# Patient Record
Sex: FEMALE | Race: WHITE | NOT HISPANIC OR LATINO | Employment: OTHER
[De-identification: names, ages, dates, MRNs, and addresses within clinical notes are randomized per-mention and may not be internally consistent; named-entity substitution may affect disease eponyms.]

---

## 2017-08-28 ENCOUNTER — RECORDS - HEALTHEAST (OUTPATIENT)
Dept: ADMINISTRATIVE | Facility: OTHER | Age: 57
End: 2017-08-28

## 2017-09-07 ENCOUNTER — COMMUNICATION - HEALTHEAST (OUTPATIENT)
Dept: FAMILY MEDICINE | Facility: CLINIC | Age: 57
End: 2017-09-07

## 2017-09-20 ENCOUNTER — HOSPITAL ENCOUNTER (OUTPATIENT)
Dept: PHYSICAL MEDICINE AND REHAB | Facility: CLINIC | Age: 57
Discharge: HOME | End: 2017-09-20
Attending: PHYSICIAN ASSISTANT

## 2017-09-20 DIAGNOSIS — M54.16 LUMBAR RADICULITIS: ICD-10-CM

## 2017-09-20 DIAGNOSIS — M47.816 LUMBAR FACET ARTHROPATHY: ICD-10-CM

## 2017-09-20 DIAGNOSIS — M79.18 MYOFASCIAL PAIN: ICD-10-CM

## 2017-09-21 ENCOUNTER — COMMUNICATION - HEALTHEAST (OUTPATIENT)
Dept: PHYSICAL MEDICINE AND REHAB | Facility: CLINIC | Age: 57
End: 2017-09-21

## 2017-09-21 ENCOUNTER — HOSPITAL ENCOUNTER (OUTPATIENT)
Dept: PHYSICAL MEDICINE AND REHAB | Facility: CLINIC | Age: 57
Discharge: HOME | End: 2017-09-21
Attending: PHYSICIAN ASSISTANT

## 2017-09-21 DIAGNOSIS — M47.816 LUMBAR FACET ARTHROPATHY: ICD-10-CM

## 2017-09-21 DIAGNOSIS — M12.88 OTHER SPECIFIC ARTHROPATHIES, NOT ELSEWHERE CLASSIFIED, OTHER SPECIFIED SITE: ICD-10-CM

## 2020-06-07 ENCOUNTER — HOSPITAL ENCOUNTER (EMERGENCY)
Facility: CLINIC | Age: 60
Discharge: HOME | End: 2020-06-08
Attending: EMERGENCY MEDICINE | Admitting: EMERGENCY MEDICINE
Payer: COMMERCIAL

## 2020-06-07 DIAGNOSIS — F10.920 ALCOHOLIC INTOXICATION WITHOUT COMPLICATION (H): ICD-10-CM

## 2020-06-07 DIAGNOSIS — R40.4 ALTERED LEVEL OF CONSCIOUSNESS: ICD-10-CM

## 2020-06-07 LAB
ALBUMIN SERPL-MCNC: 3.7 G/DL (ref 3.4–5)
ALBUMIN UR-MCNC: NEGATIVE MG/DL
ALP SERPL-CCNC: 132 U/L (ref 40–150)
ALT SERPL W P-5'-P-CCNC: 28 U/L (ref 0–50)
AMPHETAMINES UR QL SCN: POSITIVE
ANION GAP SERPL CALCULATED.3IONS-SCNC: 9 MMOL/L (ref 3–14)
APPEARANCE UR: CLEAR
AST SERPL W P-5'-P-CCNC: 15 U/L (ref 0–45)
BARBITURATES UR QL: NEGATIVE
BASOPHILS # BLD AUTO: 0.1 10E9/L (ref 0–0.2)
BASOPHILS NFR BLD AUTO: 0.7 %
BENZODIAZ UR QL: NEGATIVE
BILIRUB SERPL-MCNC: 0.4 MG/DL (ref 0.2–1.3)
BILIRUB UR QL STRIP: NEGATIVE
BUN SERPL-MCNC: 11 MG/DL (ref 7–30)
CALCIUM SERPL-MCNC: 8.6 MG/DL (ref 8.5–10.1)
CANNABINOIDS UR QL SCN: NEGATIVE
CHLORIDE SERPL-SCNC: 109 MMOL/L (ref 94–109)
CO2 SERPL-SCNC: 24 MMOL/L (ref 20–32)
COCAINE UR QL: NEGATIVE
COLOR UR AUTO: NORMAL
CREAT SERPL-MCNC: 0.8 MG/DL (ref 0.52–1.04)
DIFFERENTIAL METHOD BLD: ABNORMAL
EOSINOPHIL # BLD AUTO: 0.1 10E9/L (ref 0–0.7)
EOSINOPHIL NFR BLD AUTO: 1.5 %
ERYTHROCYTE [DISTWIDTH] IN BLOOD BY AUTOMATED COUNT: 11.7 % (ref 10–15)
ETHANOL SERPL-MCNC: 0.28 G/DL
GFR SERPL CREATININE-BSD FRML MDRD: 81 ML/MIN/{1.73_M2}
GLUCOSE SERPL-MCNC: 131 MG/DL (ref 70–99)
GLUCOSE UR STRIP-MCNC: NEGATIVE MG/DL
HCT VFR BLD AUTO: 49 % (ref 35–47)
HGB BLD-MCNC: 17.2 G/DL (ref 11.7–15.7)
HGB UR QL STRIP: NEGATIVE
IMM GRANULOCYTES # BLD: 0 10E9/L (ref 0–0.4)
IMM GRANULOCYTES NFR BLD: 0.5 %
KETONES UR STRIP-MCNC: NEGATIVE MG/DL
LEUKOCYTE ESTERASE UR QL STRIP: NEGATIVE
LYMPHOCYTES # BLD AUTO: 3.2 10E9/L (ref 0.8–5.3)
LYMPHOCYTES NFR BLD AUTO: 38 %
MCH RBC QN AUTO: 31.4 PG (ref 26.5–33)
MCHC RBC AUTO-ENTMCNC: 35.1 G/DL (ref 31.5–36.5)
MCV RBC AUTO: 90 FL (ref 78–100)
MONOCYTES # BLD AUTO: 0.5 10E9/L (ref 0–1.3)
MONOCYTES NFR BLD AUTO: 6.1 %
NEUTROPHILS # BLD AUTO: 4.5 10E9/L (ref 1.6–8.3)
NEUTROPHILS NFR BLD AUTO: 53.2 %
NITRATE UR QL: NEGATIVE
NRBC # BLD AUTO: 0 10*3/UL
NRBC BLD AUTO-RTO: 0 /100
OPIATES UR QL SCN: NEGATIVE
PCP UR QL SCN: NEGATIVE
PH UR STRIP: 6 PH (ref 5–7)
PLATELET # BLD AUTO: 226 10E9/L (ref 150–450)
POTASSIUM SERPL-SCNC: 3.7 MMOL/L (ref 3.4–5.3)
PROT SERPL-MCNC: 8 G/DL (ref 6.8–8.8)
RBC # BLD AUTO: 5.47 10E12/L (ref 3.8–5.2)
SODIUM SERPL-SCNC: 142 MMOL/L (ref 133–144)
SOURCE: NORMAL
SP GR UR STRIP: 1 (ref 1–1.03)
UROBILINOGEN UR STRIP-MCNC: 0 MG/DL (ref 0–2)
WBC # BLD AUTO: 8.5 10E9/L (ref 4–11)

## 2020-06-07 PROCEDURE — 80320 DRUG SCREEN QUANTALCOHOLS: CPT | Performed by: EMERGENCY MEDICINE

## 2020-06-07 PROCEDURE — 80053 COMPREHEN METABOLIC PANEL: CPT | Performed by: EMERGENCY MEDICINE

## 2020-06-07 PROCEDURE — 25000128 H RX IP 250 OP 636: Performed by: EMERGENCY MEDICINE

## 2020-06-07 PROCEDURE — 85025 COMPLETE CBC W/AUTO DIFF WBC: CPT | Performed by: EMERGENCY MEDICINE

## 2020-06-07 PROCEDURE — 80307 DRUG TEST PRSMV CHEM ANLYZR: CPT | Performed by: EMERGENCY MEDICINE

## 2020-06-07 PROCEDURE — 81003 URINALYSIS AUTO W/O SCOPE: CPT | Performed by: EMERGENCY MEDICINE

## 2020-06-07 PROCEDURE — 99284 EMERGENCY DEPT VISIT MOD MDM: CPT | Mod: Z6 | Performed by: EMERGENCY MEDICINE

## 2020-06-07 PROCEDURE — 96372 THER/PROPH/DIAG INJ SC/IM: CPT | Performed by: EMERGENCY MEDICINE

## 2020-06-07 PROCEDURE — 82075 ASSAY OF BREATH ETHANOL: CPT | Performed by: EMERGENCY MEDICINE

## 2020-06-07 PROCEDURE — 99284 EMERGENCY DEPT VISIT MOD MDM: CPT | Performed by: EMERGENCY MEDICINE

## 2020-06-07 RX ORDER — OLANZAPINE 10 MG/2ML
10 INJECTION, POWDER, FOR SOLUTION INTRAMUSCULAR DAILY PRN
Status: DISCONTINUED | OUTPATIENT
Start: 2020-06-07 | End: 2020-06-08 | Stop reason: HOSPADM

## 2020-06-07 RX ADMIN — OLANZAPINE 10 MG: 10 INJECTION, POWDER, FOR SOLUTION INTRAMUSCULAR at 15:53

## 2020-06-07 ASSESSMENT — MIFFLIN-ST. JEOR: SCORE: 1218.65
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
## 2020-06-07 NOTE — ED TRIAGE NOTES
"Patient found at [facility] off [address] after bystanders called PD due to patient being intoxicated. Upon PD arrival patient was not acting appropriately. PD concerned for patient's health. Placed on transport hold and EMS contacted to bring to ED. Upon patient arrival, patient had her hand over her eyes and refused to remove it After several minutes she removed her hands from her eyes and stated \"this does not look right.\" Patient believed she was at \[facility]\"". Stated her Aunt \"Melisa\" is the head of the hospital and she is also a nurse. Patient argumentative regarding where she was and how she got there. She stated her \"Friend\" (EMS) brought her here from Jerrod's house. \"How tall are you 5'7\"? You think I can't take a bitch like you down?\" She denies any substances other than ETOH.   "

## 2020-06-07 NOTE — ED NOTES
Patient attempted to leave ER. Code 21 called. Patient spoke with provider for a long period of time. Provider was escorting patient back to room but again attempted to leave emergency department. Provider again attempted to persuade patient to go back to her room to be treated. Patient continued to be verbally abusive to all staff. Escorted back to room by RNs with security. Patient lying on floor. Multiple attempts to comfort patient. Patient refused to get on to cart or to have mattress on floor. Patient was given IM Zyprexa. Patient continues to lie on floor.

## 2020-06-07 NOTE — ED AVS SNAPSHOT
[facility] Emergency Department  [address] [address]  Phone:  [phone]  Fax:  [phone]                                    Vitaly Roach   MRN: [id number]    Department:  [facility] Emergency Department   Date of Visit:  6/7/2020           After Visit Summary Signature Page    I have received my discharge instructions, and my questions have been answered. I have discussed any challenges I see with this plan with the nurse or doctor.    ..........................................................................................................................................  Patient/Patient Representative Signature      ..........................................................................................................................................  Patient Representative Print Name and Relationship to Patient    ..................................................               ................................................  Date                                   Time    ..........................................................................................................................................  Reviewed by Signature/Title    ...................................................              ..............................................  Date                                               Time          22EPIC Rev 08/18

## 2020-06-07 NOTE — ED NOTES
Patient moved on to cart with assistance of PCA. Patient arouses to touch. Lab work collected. VSS. 1:1 in place.

## 2020-06-08 VITALS
TEMPERATURE: 98.3 F | HEART RATE: 74 BPM | DIASTOLIC BLOOD PRESSURE: 100 MMHG | BODY MASS INDEX: 27.6 KG/M2 | SYSTOLIC BLOOD PRESSURE: 168 MMHG | OXYGEN SATURATION: 96 % | RESPIRATION RATE: 18 BRPM | WEIGHT: 150 LBS | HEIGHT: 62 IN

## 2020-06-08 LAB — ALCOHOL BREATH TEST: 0 (ref 0–0.01)

## 2020-06-08 NOTE — ED PROVIDER NOTES
Emergency Department Patient Sign-out       Brief HPI:  This is a 59 year old female signed out to me by Dr. SLOANE Deleon at shift change at 6AM.  Plan at the time of signout is that patient is awaiting a ride home.  Patient presented with acute alcohol intoxication with concern about acute encephalopathy likely related to her state of intoxication with agitation.  She required IM Zyprexa for agitation-because she was verbally abusive to the medical staff per documentation and was a threat to herself and safety of the healthcare team. She was allowed to metabolize her alcohol during her ED course.  After period of care in the emergency department plan is to discharge patient home as she has now metabolized her alcohol-after presenting with prehospital blood alcohol level by breathalyzer by police at 0.26 and blood alcohol level in the emergency department of 0.28.  At the time of handoff her blood alcohol level is 0    See Lissa Deleon, Sage and Carissa ED note for details of the presentation, ED course, work-up and plan of care prior to handoff at 6AM.       Significant Events prior to my assuming care: Patient was seen and examined at 6:20 AM.  She was pleasant awake, alert, oriented x3.  GCS 15.  She reported she was not able to secure a ride because she did not have her phone. Patient reports her phone is in her car. Patient reports she lives in [place], [place] with a room mate.  Nursing was able to secure a taxi ride for discharge.    Exam:   Patient Vitals for the past 24 hrs:   BP Temp Temp src Pulse Resp SpO2 Height Weight   06/08/20 0533 -- -- -- -- -- 96 % -- --   06/08/20 0530 (!) 168/100 -- -- -- -- -- -- --   06/08/20 0200 139/85 -- -- 74 -- 97 % -- --   06/08/20 0145 -- -- -- -- -- 94 % -- --   06/08/20 0130 -- -- -- -- -- 95 % -- --   06/08/20 0115 125/70 -- -- -- -- 95 % -- --   06/08/20 0110 126/80 -- -- 73 -- 93 % -- --   06/07/20 2316 137/86 -- -- 72 -- 97 % -- --   06/07/20 1730 125/83 -- -- 76  "-- 95 % -- --   06/07/20 1700 90/62 -- -- 80 18 93 % -- --   06/07/20 1630 91/67 -- -- 88 -- 93 % -- --   06/07/20 1617 127/72 -- -- 86 -- 95 % -- --   06/07/20 1521 (!) 126/91 98.3  F (36.8  C) Oral 95 18 -- 1.575 m (5' 2\") 68 kg (150 lb)           ED RESULTS:   Results for orders placed or performed during the hospital encounter of 06/07/20 (from the past 24 hour(s))   CBC with platelets differential     Status: Abnormal    Collection Time: 06/07/20  4:14 PM   Result Value Ref Range    WBC 8.5 4.0 - 11.0 10e9/L    RBC Count 5.47 (H) 3.8 - 5.2 10e12/L    Hemoglobin 17.2 (H) 11.7 - 15.7 g/dL    Hematocrit 49.0 (H) 35.0 - 47.0 %    MCV 90 78 - 100 fl    MCH 31.4 26.5 - 33.0 pg    MCHC 35.1 31.5 - 36.5 g/dL    RDW 11.7 10.0 - 15.0 %    Platelet Count 226 150 - 450 10e9/L    Diff Method Automated Method     % Neutrophils 53.2 %    % Lymphocytes 38.0 %    % Monocytes 6.1 %    % Eosinophils 1.5 %    % Basophils 0.7 %    % Immature Granulocytes 0.5 %    Nucleated RBCs 0 0 /100    Absolute Neutrophil 4.5 1.6 - 8.3 10e9/L    Absolute Lymphocytes 3.2 0.8 - 5.3 10e9/L    Absolute Monocytes 0.5 0.0 - 1.3 10e9/L    Absolute Eosinophils 0.1 0.0 - 0.7 10e9/L    Absolute Basophils 0.1 0.0 - 0.2 10e9/L    Abs Immature Granulocytes 0.0 0 - 0.4 10e9/L    Absolute Nucleated RBC 0.0    Comprehensive metabolic panel     Status: Abnormal    Collection Time: 06/07/20  4:14 PM   Result Value Ref Range    Sodium 142 133 - 144 mmol/L    Potassium 3.7 3.4 - 5.3 mmol/L    Chloride 109 94 - 109 mmol/L    Carbon Dioxide 24 20 - 32 mmol/L    Anion Gap 9 3 - 14 mmol/L    Glucose 131 (H) 70 - 99 mg/dL    Urea Nitrogen 11 7 - 30 mg/dL    Creatinine 0.80 0.52 - 1.04 mg/dL    GFR Estimate 81 >60 mL/min/[1.73_m2]    GFR Estimate If Black >90 >60 mL/min/[1.73_m2]    Calcium 8.6 8.5 - 10.1 mg/dL    Bilirubin Total 0.4 0.2 - 1.3 mg/dL    Albumin 3.7 3.4 - 5.0 g/dL    Protein Total 8.0 6.8 - 8.8 g/dL    Alkaline Phosphatase 132 40 - 150 U/L    ALT 28 0 - " 50 U/L    AST 15 0 - 45 U/L   Alcohol ethyl     Status: Abnormal    Collection Time: 06/07/20  4:14 PM   Result Value Ref Range    Ethanol g/dL 0.28 (H) <0.01 g/dL   Drug abuse screen urine     Status: Abnormal    Collection Time: 06/07/20  5:44 PM   Result Value Ref Range    Amphetamine Qual Urine Positive (A) NEG^Negative    Barbiturates Qual Urine Negative NEG^Negative    Benzodiazepine Qual Urine Negative NEG^Negative    Cannabinoids Qual Urine Negative NEG^Negative    Cocaine Qual Urine Negative NEG^Negative    Opiates Qualitative Urine Negative NEG^Negative    PCP Qual Urine Negative NEG^Negative   UA reflex to Microscopic and Culture     Status: None    Collection Time: 06/07/20  5:44 PM    Specimen: Unspecified Urine   Result Value Ref Range    Color Urine Straw     Appearance Urine Clear     Glucose Urine Negative NEG^Negative mg/dL    Bilirubin Urine Negative NEG^Negative    Ketones Urine Negative NEG^Negative mg/dL    Specific Gravity Urine 1.003 1.003 - 1.035    Blood Urine Negative NEG^Negative    pH Urine 6.0 5.0 - 7.0 pH    Protein Albumin Urine Negative NEG^Negative mg/dL    Urobilinogen mg/dL 0.0 0.0 - 2.0 mg/dL    Nitrite Urine Negative NEG^Negative    Leukocyte Esterase Urine Negative NEG^Negative    Source Unspecified Urine    Alcohol Breath Test     Status: Normal    Collection Time: 06/08/20  5:46 AM   Result Value Ref Range    Alcohol Breath Test 0.00 0.00 - 0.01       ED MEDICATIONS:   Medications   OLANZapine (zyPREXA) injection 10 mg (10 mg Intramuscular Given 6/7/20 7314)         Impression:    ICD-10-CM    1. Alcoholic intoxication without complication (H)  F10.920    2. Altered level of consciousness  R40.4     resolved       Plan:    Discharge to home.    MD Charmaine Mitchell Ebenezer Tope, MD  06/08/20 0659

## 2020-06-08 NOTE — ED NOTES
Looking for sober ride home. Pt does not have a phone. Pt doesn't know any numbers. Asked if her son would get her, she states yes.   Called son but number is not in service. Pt not able to provide any names or numbers for sober ride

## 2020-06-08 NOTE — ED PROVIDER NOTES
"     Emergency Department Patient Sign-out       Brief HPI:  This is a 59 year old female signed out to me by Dr. Cazares .  See initial ED Provider note for details of the presentation.            Significant Events prior to my assuming care: zyprexa due to agitation      Exam:   Patient Vitals for the past 24 hrs:   BP Temp Temp src Pulse Resp SpO2 Height Weight   06/08/20 0533 -- -- -- -- -- 96 % -- --   06/08/20 0530 (!) 168/100 -- -- -- -- -- -- --   06/08/20 0200 139/85 -- -- 74 -- 97 % -- --   06/08/20 0145 -- -- -- -- -- 94 % -- --   06/08/20 0130 -- -- -- -- -- 95 % -- --   06/08/20 0115 125/70 -- -- -- -- 95 % -- --   06/08/20 0110 126/80 -- -- 73 -- 93 % -- --   06/07/20 2316 137/86 -- -- 72 -- 97 % -- --   06/07/20 1730 125/83 -- -- 76 -- 95 % -- --   06/07/20 1700 90/62 -- -- 80 18 93 % -- --   06/07/20 1630 91/67 -- -- 88 -- 93 % -- --   06/07/20 1617 127/72 -- -- 86 -- 95 % -- --   06/07/20 1521 (!) 126/91 98.3  F (36.8  C) Oral 95 18 -- 1.575 m (5' 2\") 68 kg (150 lb)           ED RESULTS:   Results for orders placed or performed during the hospital encounter of 06/07/20 (from the past 24 hour(s))   CBC with platelets differential     Status: Abnormal    Collection Time: 06/07/20  4:14 PM   Result Value Ref Range    WBC 8.5 4.0 - 11.0 10e9/L    RBC Count 5.47 (H) 3.8 - 5.2 10e12/L    Hemoglobin 17.2 (H) 11.7 - 15.7 g/dL    Hematocrit 49.0 (H) 35.0 - 47.0 %    MCV 90 78 - 100 fl    MCH 31.4 26.5 - 33.0 pg    MCHC 35.1 31.5 - 36.5 g/dL    RDW 11.7 10.0 - 15.0 %    Platelet Count 226 150 - 450 10e9/L    Diff Method Automated Method     % Neutrophils 53.2 %    % Lymphocytes 38.0 %    % Monocytes 6.1 %    % Eosinophils 1.5 %    % Basophils 0.7 %    % Immature Granulocytes 0.5 %    Nucleated RBCs 0 0 /100    Absolute Neutrophil 4.5 1.6 - 8.3 10e9/L    Absolute Lymphocytes 3.2 0.8 - 5.3 10e9/L    Absolute Monocytes 0.5 0.0 - 1.3 10e9/L    Absolute Eosinophils 0.1 0.0 - 0.7 10e9/L    Absolute Basophils 0.1 " 0.0 - 0.2 10e9/L    Abs Immature Granulocytes 0.0 0 - 0.4 10e9/L    Absolute Nucleated RBC 0.0    Comprehensive metabolic panel     Status: Abnormal    Collection Time: 06/07/20  4:14 PM   Result Value Ref Range    Sodium 142 133 - 144 mmol/L    Potassium 3.7 3.4 - 5.3 mmol/L    Chloride 109 94 - 109 mmol/L    Carbon Dioxide 24 20 - 32 mmol/L    Anion Gap 9 3 - 14 mmol/L    Glucose 131 (H) 70 - 99 mg/dL    Urea Nitrogen 11 7 - 30 mg/dL    Creatinine 0.80 0.52 - 1.04 mg/dL    GFR Estimate 81 >60 mL/min/[1.73_m2]    GFR Estimate If Black >90 >60 mL/min/[1.73_m2]    Calcium 8.6 8.5 - 10.1 mg/dL    Bilirubin Total 0.4 0.2 - 1.3 mg/dL    Albumin 3.7 3.4 - 5.0 g/dL    Protein Total 8.0 6.8 - 8.8 g/dL    Alkaline Phosphatase 132 40 - 150 U/L    ALT 28 0 - 50 U/L    AST 15 0 - 45 U/L   Alcohol ethyl     Status: Abnormal    Collection Time: 06/07/20  4:14 PM   Result Value Ref Range    Ethanol g/dL 0.28 (H) <0.01 g/dL   Drug abuse screen urine     Status: Abnormal    Collection Time: 06/07/20  5:44 PM   Result Value Ref Range    Amphetamine Qual Urine Positive (A) NEG^Negative    Barbiturates Qual Urine Negative NEG^Negative    Benzodiazepine Qual Urine Negative NEG^Negative    Cannabinoids Qual Urine Negative NEG^Negative    Cocaine Qual Urine Negative NEG^Negative    Opiates Qualitative Urine Negative NEG^Negative    PCP Qual Urine Negative NEG^Negative   UA reflex to Microscopic and Culture     Status: None    Collection Time: 06/07/20  5:44 PM    Specimen: Unspecified Urine   Result Value Ref Range    Color Urine Straw     Appearance Urine Clear     Glucose Urine Negative NEG^Negative mg/dL    Bilirubin Urine Negative NEG^Negative    Ketones Urine Negative NEG^Negative mg/dL    Specific Gravity Urine 1.003 1.003 - 1.035    Blood Urine Negative NEG^Negative    pH Urine 6.0 5.0 - 7.0 pH    Protein Albumin Urine Negative NEG^Negative mg/dL    Urobilinogen mg/dL 0.0 0.0 - 2.0 mg/dL    Nitrite Urine Negative NEG^Negative     Leukocyte Esterase Urine Negative NEG^Negative    Source Unspecified Urine    Alcohol Breath Test     Status: Normal    Collection Time: 06/08/20  5:46 AM   Result Value Ref Range    Alcohol Breath Test 0.00 0.00 - 0.01       ED MEDICATIONS:   Medications   OLANZapine (zyPREXA) injection 10 mg (10 mg Intramuscular Given 6/7/20 1553)         Impression:    ICD-10-CM    1. Alcoholic intoxication without complication (H)  F10.920    2. Altered level of consciousness  R40.4     resolved       Plan:    Pending studies include observation until sober. Will need further when awake, disposition.     4:10 AM: Assessed at bedside.  Patient resting comfortably, breathing easily.    5:33 AM: Reassessed at bedside.  Alert and oriented.  Does not have any recollection of the events of the night.  Last remembers driving her friend home.  She admits to some drinking of alcohol last night.  Denies any other drug use.  Denies any depression or suicidal thoughts.  Only complains of feeling mildly sore but this, she reports, is normal.  Denies any chest pain, difficulty breathing, abdominal pain, nausea, or other acute symptoms.  Patient states she believes she knows her son's number to get a ride home.    MD Pancho Gann, Bobby Garrett MD  06/08/20 0601

## 2021-05-31 VITALS — WEIGHT: 178 LBS | BODY MASS INDEX: 32.04 KG/M2

## 2021-06-13 NOTE — PROGRESS NOTES
Assessment:   Vitaly Roach is a 56 y.o. y.o. female with past medical history significant for diabetes mellitus, hypertension, vitamin D deficiency, bipolar disorder chronic left greater than right low back pain with radiation into the lower extremity paresthesias who presents today for follow-up regarding.  Spine shows lumbar spondylosis most severe at L5-S1 where there is a broad-based disc bulge and facet arthropathy which contributes to left greater than right foraminal stenosis and lateral recess stenosis.  The patient had a positive response of bilateral L2, L3, L4, L5 medial branch blocks performed at [facility] pain management and November 2015 and subsequently June 2016.  She was planning on following through with the radiofrequency ablation but then she violated her parole and had to go to [facility].  Her insurance has now changed and she is no longer able to go to [facility].       Plan:     A shared decision making plan was used.  The patient's values and choices were respected.  The following represents what was discussed and decided upon by the physician assistant and the patient.      1.  DIAGNOSTIC TESTS: I reviewed the MRI lumbar spine.  No further diagnostic tests were ordered.    2.  PHYSICAL THERAPY: Patient noted to have patient go to physical therapy at the [facility] location of [facility] rehab.    3.  MEDICATIONS:   -Methocarbamol 500-1,000 mg 3 times daily as needed was prescribed.  -Also prescribed lidocaine ointment.  -I had a kenny discussion with the patient regarding use of opiates.  She has used opiates on and off throughout the years.  [facility] was providing OxyContin.  I told the patient that I do not feel prescribing opiates for her chronic pain.  I did offer a referral to the [facility] pain clinic.  The patient indicated that she would actually prefer not to use opiates.  They cause nausea.  She would like to try non-opiate pain relieving medications, physical therapy,  and injections.    4.  INTERVENTIONS: I offeredthe patient had left L3-4, L4-5 and L5-S1 facet joint injection.  I chose this because she did have a positive response to a bilateral L2, L3, L4, L5 medial branch block ×2 at [facility].  The patient's pain is significantly worse on the left (75% left-sided pain is 25% right-sided pain).  The patient indicated she would like to proceed and an order was placed.  If the patient continues have significant right-sided pain, we could perform a right L3-4, L4-5 and some facet joint injection.  I told the patient that if the facet joint injections provided only short-term relief, she may be a candidate for radiofrequency ablation.  We would need to repeat her medial branch blocks.    5.  PATIENT EDUCATION: The patient is in agreement with the above plan.  All questions were answered.    6.  FOLLOW-UP: The patient return to the clinic for her left L3-4 and injection.  She has any questions or concerns in the meantime, she should not clinic.    Subjective:     Vitaly Roach is a 56 y.o. female who presents today for follow-up regarding her low back pain with intermittent radiation into the left lower extremity and bilateral lower extremity paresthesias.  I last saw the patient on October 20, 2015.  At that time she requested a referral to the pain clinic.  She has had [facility] pain and she had a positive response to a bilateral L2, L3, L4, L5 medial branch block ×2.  These were performed in November 2015 in June 2016.  The patient was planning on moving forward with radiofrequency ablation, however, she violated her parole and had to go to detention.  Does not change and she is unable to follow back up with [facility] pain and she returns today because she would like to continue treatments for her low back pain.    The patient complains of left greater than right low back pain.  The patient states that her pain is 75% left-sided and 25% right-sided.  She occasionally has  pain which radiates in the left buttock and down the left posterior thigh, extending into the posterior calf.  Her back pain is much more severe than her leg pain.  She has intermittent numbness and tingling on the plantar forefoot of both feet.  She rates her pain today as an 8 out of 10.  At its best it is a 3 out of 10.  At its worst it is a 10 out of 10.  The patient's pain is aggravated with bending.  It is also aggravated with rolling over in bed.  Transitioning from seated to standing also aggravates the pain, especially if she has been sitting for long period of time.  Her pain is alleviated with laying flat on her back.  The patient states that she has a history of urinary incontinence which is stable.  She feels generally weak.  She also feels that her muscle is very stiff and tight on the left side of her back.    The patient is on physical therapy for her lower back several years ago.  She is currently using Tylenol as needed for pain.  She is prescribed Percocet by the emergency department on September 17.  She has a history of opiate use including OxyContin 10 mg 4 times daily prescribed by the [facility] pain clinic.    Past history is reviewed and is pertinent for the pain clinic treatments and also seeing [facility] for psychiatric care.    Family history is reviewed and is unchanged in the interim.    Review of Systems:  Positive for numbness/tingling, loss of bladder control, weakness, headache, dizziness, nausea/vomiting, blurry vision, balance changes.  Negative for foot drop.     Objective:   CONSTITUTIONAL:  Vital signs as above.  No acute distress.  The patient is well nourished and well groomed.    PSYCHIATRIC:  The patient is awake, alert, oriented to person, place and time.  The patient is answering questions appropriately with clear speech.  Normal affect.  HEENT: Normocephalic, atraumatic.  Sclera clear.    SKIN:  Skin over the face, posterior torso, bilateral upper and  lower extremities is clean, dry, intact without rashes.  MUSCULOSKELETAL:  Gait is non-antalgic. Mild tenderness over the left greater than right lower lumbar paraspinal muscles.      The patient has 5/5 strength for the bilateral hip flexors, knee flexors/extensors, ankle dorsiflexors/plantar flexors, ankle evertors/invertors.  Lumbar extension is restricted due to pain.  Lumbar is left-sided lower back pain.  Rohit's test on the left cause right-sided low back pain.  Rohit's test negative to reproduce left-sided low back pain.  Negative stress on the left.  Negative pelvic distraction.  NEUROLOGICAL: 2+ patellar, achilles reflexes which are symmetric bilaterally.  No ankle clonus bilaterally.  Sensation to light touch is intact in the bilateral L4, L5, and S1 dermatomes.       RESULTS: MRI lumbar spine from [facility] dated September 23, 2015.  This shows mild lumbar spondylosis.  There is mild facet arthropathy at L3-4 and L5-S1.  At L5-S1 there is moderate loss of disc height and a mild broad-based disc protrusion which results in mild spinal canal stenosis and mild right greater than left foraminal stenosis.  There is also mild bilateral foraminal stenosis at L4-5 secondary to a broad-based disc protrusion.  Please see report for further details.

## 2021-06-23 ENCOUNTER — COMMUNICATION - HEALTHEAST (OUTPATIENT)
Dept: SCHEDULING | Facility: CLINIC | Age: 61
End: 2021-06-23

## 2021-06-28 ENCOUNTER — COMMUNICATION - HEALTHEAST (OUTPATIENT)
Dept: CARDIOLOGY | Facility: CLINIC | Age: 61
End: 2021-06-28

## 2021-06-28 DIAGNOSIS — I46.9 CARDIAC ARREST (H): ICD-10-CM

## 2021-06-29 ENCOUNTER — TRANSCRIBE ORDERS (OUTPATIENT)
Dept: EMERGENCY MEDICINE | Facility: HOSPITAL | Age: 61
End: 2021-06-29

## 2021-06-29 DIAGNOSIS — K74.60 HEPATIC CIRRHOSIS, UNSPECIFIED HEPATIC CIRRHOSIS TYPE (H): ICD-10-CM

## 2021-06-29 DIAGNOSIS — T40.2X4A OPIOID OVERDOSE, UNDETERMINED INTENT, INITIAL ENCOUNTER (H): Primary | ICD-10-CM

## 2021-07-07 NOTE — TELEPHONE ENCOUNTER
----- Message from MINAL Rolon sent at 6/28/2021  9:08 AM CDT -----  Regarding: per Discharge line patient is supoosed to have a DONNA monitor - 30 day  Hello,   I was forwarded a message that Dr Heaton saw this patient in the hospital and that they need to be scheduled for a  DONNA monitor - 30 day.   Can I get an order so I can call the patient?   I will double check her insurance when I call.    Thank you   Trinh

## 2021-07-14 ENCOUNTER — COMMUNICATION - HEALTHEAST (OUTPATIENT)
Dept: PHYSICAL MEDICINE AND REHAB | Facility: CLINIC | Age: 61
End: 2021-07-14

## 2021-09-11 ENCOUNTER — APPOINTMENT (OUTPATIENT)
Dept: CT IMAGING | Facility: HOSPITAL | Age: 61
DRG: 177 | End: 2021-09-11
Attending: EMERGENCY MEDICINE
Payer: COMMERCIAL

## 2021-09-11 ENCOUNTER — HOSPITAL ENCOUNTER (INPATIENT)
Facility: HOSPITAL | Age: 61
LOS: 3 days | Discharge: HOME | DRG: 177 | End: 2021-09-15
Attending: EMERGENCY MEDICINE | Admitting: FAMILY MEDICINE
Payer: COMMERCIAL

## 2021-09-11 DIAGNOSIS — I10 ESSENTIAL HYPERTENSION: ICD-10-CM

## 2021-09-11 DIAGNOSIS — M54.50 CHRONIC LOW BACK PAIN WITHOUT SCIATICA, UNSPECIFIED BACK PAIN LATERALITY: Primary | ICD-10-CM

## 2021-09-11 DIAGNOSIS — U07.1 PNEUMONIA DUE TO 2019 NOVEL CORONAVIRUS: ICD-10-CM

## 2021-09-11 DIAGNOSIS — J12.82 PNEUMONIA DUE TO 2019 NOVEL CORONAVIRUS: ICD-10-CM

## 2021-09-11 DIAGNOSIS — G89.29 CHRONIC LOW BACK PAIN WITHOUT SCIATICA, UNSPECIFIED BACK PAIN LATERALITY: Primary | ICD-10-CM

## 2021-09-11 DIAGNOSIS — D69.6 THROMBOCYTOPENIA (H): ICD-10-CM

## 2021-09-11 DIAGNOSIS — E11.65 TYPE 2 DIABETES MELLITUS WITH HYPERGLYCEMIA, WITHOUT LONG-TERM CURRENT USE OF INSULIN (H): ICD-10-CM

## 2021-09-11 PROBLEM — T40.2X1A OPIOID OVERDOSE, ACCIDENTAL OR UNINTENTIONAL, INITIAL ENCOUNTER (H): Status: ACTIVE | Noted: 2021-06-22

## 2021-09-11 PROBLEM — I46.9 CARDIAC ARREST (H): Status: ACTIVE | Noted: 2021-09-11

## 2021-09-11 LAB
ALBUMIN SERPL-MCNC: 3.2 G/DL (ref 3.5–5)
ALP SERPL-CCNC: 82 U/L (ref 45–120)
ALT SERPL W P-5'-P-CCNC: 20 U/L (ref 0–45)
ANION GAP SERPL CALCULATED.3IONS-SCNC: 11 MMOL/L (ref 5–18)
APTT PPP: 29 SECONDS (ref 22–38)
AST SERPL W P-5'-P-CCNC: 31 U/L (ref 0–40)
BASOPHILS # BLD MANUAL: 0 10E3/UL (ref 0–0.2)
BASOPHILS NFR BLD MANUAL: 0 %
BILIRUB DIRECT SERPL-MCNC: 0.1 MG/DL
BILIRUB SERPL-MCNC: 0.4 MG/DL (ref 0–1)
BUN SERPL-MCNC: 9 MG/DL (ref 8–22)
C REACTIVE PROTEIN LHE: 1.3 MG/DL (ref 0–0.8)
CALCIUM SERPL-MCNC: 8.6 MG/DL (ref 8.5–10.5)
CHLORIDE BLD-SCNC: 101 MMOL/L (ref 98–107)
CO2 SERPL-SCNC: 22 MMOL/L (ref 22–31)
CREAT SERPL-MCNC: 0.77 MG/DL (ref 0.6–1.1)
D DIMER PPP FEU-MCNC: 1.01 UG/ML FEU (ref 0–0.5)
EOSINOPHIL # BLD MANUAL: 0 10E3/UL (ref 0–0.7)
EOSINOPHIL NFR BLD MANUAL: 0 %
ERYTHROCYTE [DISTWIDTH] IN BLOOD BY AUTOMATED COUNT: 11.8 % (ref 10–15)
ETHANOL SERPL-MCNC: <10 MG/DL
GFR SERPL CREATININE-BSD FRML MDRD: 85 ML/MIN/1.73M2
GLUCOSE BLD-MCNC: 116 MG/DL (ref 70–125)
HCT VFR BLD AUTO: 42.7 % (ref 35–47)
HGB BLD-MCNC: 14.8 G/DL (ref 11.7–15.7)
INR PPP: 1.01 (ref 0.9–1.15)
LACTATE SERPL-SCNC: 1.2 MMOL/L (ref 0.7–2)
LYMPHOCYTES # BLD MANUAL: 1 10E3/UL (ref 0.8–5.3)
LYMPHOCYTES NFR BLD MANUAL: 36 %
MAGNESIUM SERPL-MCNC: 1.9 MG/DL (ref 1.8–2.6)
MCH RBC QN AUTO: 31.1 PG (ref 26.5–33)
MCHC RBC AUTO-ENTMCNC: 34.7 G/DL (ref 31.5–36.5)
MCV RBC AUTO: 90 FL (ref 78–100)
MONOCYTES # BLD MANUAL: 0.2 10E3/UL (ref 0–1.3)
MONOCYTES NFR BLD MANUAL: 8 %
NEUTROPHILS # BLD MANUAL: 1.5 10E3/UL (ref 1.6–8.3)
NEUTROPHILS NFR BLD MANUAL: 56 %
PLAT MORPH BLD: ABNORMAL
PLATELET # BLD AUTO: 91 10E3/UL (ref 150–450)
POTASSIUM BLD-SCNC: 3.8 MMOL/L (ref 3.5–5)
PROT SERPL-MCNC: 6.7 G/DL (ref 6–8)
RBC # BLD AUTO: 4.76 10E6/UL (ref 3.8–5.2)
RBC MORPH BLD: ABNORMAL
SARS-COV-2 RNA RESP QL NAA+PROBE: POSITIVE
SODIUM SERPL-SCNC: 134 MMOL/L (ref 136–145)
TROPONIN I SERPL-MCNC: <0.01 NG/ML (ref 0–0.29)
WBC # BLD AUTO: 2.7 10E3/UL (ref 4–11)

## 2021-09-11 PROCEDURE — 99223 1ST HOSP IP/OBS HIGH 75: CPT | Performed by: FAMILY MEDICINE

## 2021-09-11 PROCEDURE — 85379 FIBRIN DEGRADATION QUANT: CPT | Performed by: EMERGENCY MEDICINE

## 2021-09-11 PROCEDURE — 83735 ASSAY OF MAGNESIUM: CPT | Performed by: EMERGENCY MEDICINE

## 2021-09-11 PROCEDURE — 250N000011 HC RX IP 250 OP 636: Performed by: EMERGENCY MEDICINE

## 2021-09-11 PROCEDURE — 86141 C-REACTIVE PROTEIN HS: CPT | Performed by: EMERGENCY MEDICINE

## 2021-09-11 PROCEDURE — 84484 ASSAY OF TROPONIN QUANT: CPT | Performed by: FAMILY MEDICINE

## 2021-09-11 PROCEDURE — 85610 PROTHROMBIN TIME: CPT | Performed by: EMERGENCY MEDICINE

## 2021-09-11 PROCEDURE — 93005 ELECTROCARDIOGRAM TRACING: CPT | Performed by: EMERGENCY MEDICINE

## 2021-09-11 PROCEDURE — 83605 ASSAY OF LACTIC ACID: CPT | Performed by: EMERGENCY MEDICINE

## 2021-09-11 PROCEDURE — 71275 CT ANGIOGRAPHY CHEST: CPT

## 2021-09-11 PROCEDURE — 82248 BILIRUBIN DIRECT: CPT | Performed by: EMERGENCY MEDICINE

## 2021-09-11 PROCEDURE — 85027 COMPLETE CBC AUTOMATED: CPT | Performed by: EMERGENCY MEDICINE

## 2021-09-11 PROCEDURE — 87040 BLOOD CULTURE FOR BACTERIA: CPT | Performed by: EMERGENCY MEDICINE

## 2021-09-11 PROCEDURE — 82077 ASSAY SPEC XCP UR&BREATH IA: CPT | Performed by: EMERGENCY MEDICINE

## 2021-09-11 PROCEDURE — C9803 HOPD COVID-19 SPEC COLLECT: HCPCS

## 2021-09-11 PROCEDURE — 36415 COLL VENOUS BLD VENIPUNCTURE: CPT | Performed by: EMERGENCY MEDICINE

## 2021-09-11 PROCEDURE — 96374 THER/PROPH/DIAG INJ IV PUSH: CPT | Mod: 59

## 2021-09-11 PROCEDURE — 85730 THROMBOPLASTIN TIME PARTIAL: CPT | Performed by: EMERGENCY MEDICINE

## 2021-09-11 PROCEDURE — XW043E5 INTRODUCTION OF REMDESIVIR ANTI-INFECTIVE INTO CENTRAL VEIN, PERCUTANEOUS APPROACH, NEW TECHNOLOGY GROUP 5: ICD-10-PCS | Performed by: HOSPITALIST

## 2021-09-11 PROCEDURE — 85384 FIBRINOGEN ACTIVITY: CPT | Performed by: FAMILY MEDICINE

## 2021-09-11 PROCEDURE — 99285 EMERGENCY DEPT VISIT HI MDM: CPT | Mod: 25

## 2021-09-11 PROCEDURE — 84484 ASSAY OF TROPONIN QUANT: CPT | Performed by: EMERGENCY MEDICINE

## 2021-09-11 PROCEDURE — 87635 SARS-COV-2 COVID-19 AMP PRB: CPT | Performed by: EMERGENCY MEDICINE

## 2021-09-11 PROCEDURE — 96375 TX/PRO/DX INJ NEW DRUG ADDON: CPT

## 2021-09-11 RX ORDER — DIPHENHYDRAMINE HCL 25 MG
1 CAPSULE ORAL EVERY 6 HOURS PRN
COMMUNITY
End: 2024-08-28

## 2021-09-11 RX ORDER — IOPAMIDOL 755 MG/ML
100 INJECTION, SOLUTION INTRAVASCULAR ONCE
Status: COMPLETED | OUTPATIENT
Start: 2021-09-11 | End: 2021-09-11

## 2021-09-11 RX ORDER — KETOROLAC TROMETHAMINE 15 MG/ML
15 INJECTION, SOLUTION INTRAMUSCULAR; INTRAVENOUS ONCE
Status: COMPLETED | OUTPATIENT
Start: 2021-09-11 | End: 2021-09-11

## 2021-09-11 RX ORDER — DEXAMETHASONE SODIUM PHOSPHATE 10 MG/ML
6 INJECTION, SOLUTION INTRAMUSCULAR; INTRAVENOUS ONCE
Status: COMPLETED | OUTPATIENT
Start: 2021-09-11 | End: 2021-09-11

## 2021-09-11 RX ADMIN — DEXAMETHASONE SODIUM PHOSPHATE 6 MG: 10 INJECTION, SOLUTION INTRAMUSCULAR; INTRAVENOUS at 22:06

## 2021-09-11 RX ADMIN — IOPAMIDOL 100 ML: 755 INJECTION, SOLUTION INTRAVENOUS at 21:46

## 2021-09-11 RX ADMIN — KETOROLAC TROMETHAMINE 15 MG: 15 INJECTION, SOLUTION INTRAMUSCULAR; INTRAVENOUS at 20:59

## 2021-09-11 NOTE — ED TRIAGE NOTES
The pt presents for evaluation of generalized weakness, loss of taste/smell and fevers for about 8 days. The pt notes that last month she accidentally overdose on a medication and was given CPR, breaking ribs, leading to pneumonia.

## 2021-09-12 LAB
ABO/RH(D): NORMAL
ALBUMIN SERPL-MCNC: 3.3 G/DL (ref 3.5–5)
ALP SERPL-CCNC: 86 U/L (ref 45–120)
ALT SERPL W P-5'-P-CCNC: 22 U/L (ref 0–45)
ANION GAP SERPL CALCULATED.3IONS-SCNC: 9 MMOL/L (ref 5–18)
APTT PPP: 32 SECONDS (ref 22–38)
AST SERPL W P-5'-P-CCNC: 31 U/L (ref 0–40)
ATRIAL RATE - MUSE: 78 BPM
BILIRUB DIRECT SERPL-MCNC: 0.2 MG/DL
BILIRUB SERPL-MCNC: 0.4 MG/DL (ref 0–1)
BUN SERPL-MCNC: 16 MG/DL (ref 8–22)
C REACTIVE PROTEIN LHE: 1.7 MG/DL (ref 0–0.8)
CALCIUM SERPL-MCNC: 8.9 MG/DL (ref 8.5–10.5)
CHLORIDE BLD-SCNC: 103 MMOL/L (ref 98–107)
CO2 SERPL-SCNC: 21 MMOL/L (ref 22–31)
CREAT SERPL-MCNC: 0.78 MG/DL (ref 0.6–1.1)
DIASTOLIC BLOOD PRESSURE - MUSE: NORMAL MMHG
ERYTHROCYTE [DISTWIDTH] IN BLOOD BY AUTOMATED COUNT: 11.7 % (ref 10–15)
ETHANOL SERPL-MCNC: <10 MG/DL
FIBRINOGEN PPP-MCNC: 466 MG/DL (ref 170–490)
GFR SERPL CREATININE-BSD FRML MDRD: 84 ML/MIN/1.73M2
GLUCOSE BLD-MCNC: 381 MG/DL (ref 70–125)
GLUCOSE BLDC GLUCOMTR-MCNC: 188 MG/DL (ref 70–99)
GLUCOSE BLDC GLUCOMTR-MCNC: 315 MG/DL (ref 70–99)
GLUCOSE BLDC GLUCOMTR-MCNC: 341 MG/DL (ref 70–99)
GLUCOSE BLDC GLUCOMTR-MCNC: 372 MG/DL (ref 70–99)
GLUCOSE BLDC GLUCOMTR-MCNC: 416 MG/DL (ref 70–99)
HBA1C MFR BLD: 7 %
HCT VFR BLD AUTO: 44.1 % (ref 35–47)
HGB BLD-MCNC: 15.7 G/DL (ref 11.7–15.7)
HOLD SPECIMEN: NORMAL
INR PPP: 1.08 (ref 0.9–1.15)
INTERPRETATION ECG - MUSE: NORMAL
LDH SERPL L TO P-CCNC: 280 U/L (ref 125–220)
MAGNESIUM SERPL-MCNC: 2.1 MG/DL (ref 1.8–2.6)
MCH RBC QN AUTO: 31.5 PG (ref 26.5–33)
MCHC RBC AUTO-ENTMCNC: 35.6 G/DL (ref 31.5–36.5)
MCV RBC AUTO: 88 FL (ref 78–100)
P AXIS - MUSE: 63 DEGREES
PHOSPHATE SERPL-MCNC: 3.7 MG/DL (ref 2.5–4.5)
PLATELET # BLD AUTO: 110 10E3/UL (ref 150–450)
POTASSIUM BLD-SCNC: 4 MMOL/L (ref 3.5–5)
PR INTERVAL - MUSE: 142 MS
PROCALCITONIN SERPL-MCNC: 0.14 NG/ML (ref 0–0.49)
PROT SERPL-MCNC: 7 G/DL (ref 6–8)
QRS DURATION - MUSE: 84 MS
QT - MUSE: 422 MS
QTC - MUSE: 481 MS
R AXIS - MUSE: 61 DEGREES
RBC # BLD AUTO: 4.99 10E6/UL (ref 3.8–5.2)
SODIUM SERPL-SCNC: 133 MMOL/L (ref 136–145)
SPECIMEN EXPIRATION DATE: NORMAL
SYSTOLIC BLOOD PRESSURE - MUSE: NORMAL MMHG
T AXIS - MUSE: 54 DEGREES
TROPONIN I SERPL-MCNC: <0.01 NG/ML (ref 0–0.29)
VENTRICULAR RATE- MUSE: 78 BPM
WBC # BLD AUTO: 3.7 10E3/UL (ref 4–11)

## 2021-09-12 PROCEDURE — 82077 ASSAY SPEC XCP UR&BREATH IA: CPT | Performed by: FAMILY MEDICINE

## 2021-09-12 PROCEDURE — 250N000011 HC RX IP 250 OP 636: Performed by: FAMILY MEDICINE

## 2021-09-12 PROCEDURE — 83735 ASSAY OF MAGNESIUM: CPT | Performed by: FAMILY MEDICINE

## 2021-09-12 PROCEDURE — 84100 ASSAY OF PHOSPHORUS: CPT | Performed by: FAMILY MEDICINE

## 2021-09-12 PROCEDURE — 84145 PROCALCITONIN (PCT): CPT | Performed by: FAMILY MEDICINE

## 2021-09-12 PROCEDURE — 250N000012 HC RX MED GY IP 250 OP 636 PS 637: Performed by: FAMILY MEDICINE

## 2021-09-12 PROCEDURE — 85730 THROMBOPLASTIN TIME PARTIAL: CPT | Performed by: FAMILY MEDICINE

## 2021-09-12 PROCEDURE — 36415 COLL VENOUS BLD VENIPUNCTURE: CPT | Performed by: FAMILY MEDICINE

## 2021-09-12 PROCEDURE — 258N000003 HC RX IP 258 OP 636: Performed by: FAMILY MEDICINE

## 2021-09-12 PROCEDURE — 83036 HEMOGLOBIN GLYCOSYLATED A1C: CPT | Performed by: FAMILY MEDICINE

## 2021-09-12 PROCEDURE — 85610 PROTHROMBIN TIME: CPT | Performed by: FAMILY MEDICINE

## 2021-09-12 PROCEDURE — 86141 C-REACTIVE PROTEIN HS: CPT | Performed by: HOSPITALIST

## 2021-09-12 PROCEDURE — 86900 BLOOD TYPING SEROLOGIC ABO: CPT | Performed by: FAMILY MEDICINE

## 2021-09-12 PROCEDURE — 250N000009 HC RX 250: Performed by: FAMILY MEDICINE

## 2021-09-12 PROCEDURE — 250N000012 HC RX MED GY IP 250 OP 636 PS 637: Performed by: HOSPITALIST

## 2021-09-12 PROCEDURE — 120N000001 HC R&B MED SURG/OB

## 2021-09-12 PROCEDURE — 99233 SBSQ HOSP IP/OBS HIGH 50: CPT | Performed by: HOSPITALIST

## 2021-09-12 PROCEDURE — 83615 LACTATE (LD) (LDH) ENZYME: CPT | Performed by: FAMILY MEDICINE

## 2021-09-12 PROCEDURE — 85014 HEMATOCRIT: CPT | Performed by: FAMILY MEDICINE

## 2021-09-12 PROCEDURE — 250N000013 HC RX MED GY IP 250 OP 250 PS 637: Performed by: FAMILY MEDICINE

## 2021-09-12 PROCEDURE — 82248 BILIRUBIN DIRECT: CPT | Performed by: FAMILY MEDICINE

## 2021-09-12 RX ORDER — FLUMAZENIL 0.1 MG/ML
0.2 INJECTION, SOLUTION INTRAVENOUS
Status: DISCONTINUED | OUTPATIENT
Start: 2021-09-12 | End: 2021-09-15 | Stop reason: HOSPADM

## 2021-09-12 RX ORDER — HALOPERIDOL 5 MG/ML
2.5-5 INJECTION INTRAMUSCULAR EVERY 6 HOURS PRN
Status: DISCONTINUED | OUTPATIENT
Start: 2021-09-12 | End: 2021-09-15 | Stop reason: HOSPADM

## 2021-09-12 RX ORDER — OLANZAPINE 5 MG/1
5-10 TABLET, ORALLY DISINTEGRATING ORAL EVERY 6 HOURS PRN
Status: DISCONTINUED | OUTPATIENT
Start: 2021-09-12 | End: 2021-09-15 | Stop reason: HOSPADM

## 2021-09-12 RX ORDER — MULTIPLE VITAMINS W/ MINERALS TAB 9MG-400MCG
1 TAB ORAL DAILY
Status: DISCONTINUED | OUTPATIENT
Start: 2021-09-12 | End: 2021-09-15 | Stop reason: HOSPADM

## 2021-09-12 RX ORDER — DEXTROSE MONOHYDRATE 25 G/50ML
25-50 INJECTION, SOLUTION INTRAVENOUS
Status: DISCONTINUED | OUTPATIENT
Start: 2021-09-12 | End: 2021-09-15 | Stop reason: HOSPADM

## 2021-09-12 RX ORDER — GABAPENTIN 100 MG/1
200 CAPSULE ORAL 3 TIMES DAILY
Status: DISCONTINUED | OUTPATIENT
Start: 2021-09-12 | End: 2021-09-15 | Stop reason: HOSPADM

## 2021-09-12 RX ORDER — ACETAMINOPHEN 325 MG/1
650 TABLET ORAL EVERY 6 HOURS PRN
Status: DISCONTINUED | OUTPATIENT
Start: 2021-09-12 | End: 2021-09-15 | Stop reason: HOSPADM

## 2021-09-12 RX ORDER — ACETAMINOPHEN 650 MG/1
650 SUPPOSITORY RECTAL EVERY 6 HOURS PRN
Status: DISCONTINUED | OUTPATIENT
Start: 2021-09-12 | End: 2021-09-15 | Stop reason: HOSPADM

## 2021-09-12 RX ORDER — LORAZEPAM 2 MG/ML
1-2 INJECTION INTRAMUSCULAR EVERY 30 MIN PRN
Status: DISCONTINUED | OUTPATIENT
Start: 2021-09-12 | End: 2021-09-15 | Stop reason: HOSPADM

## 2021-09-12 RX ORDER — LIDOCAINE 40 MG/G
CREAM TOPICAL
Status: DISCONTINUED | OUTPATIENT
Start: 2021-09-12 | End: 2021-09-15 | Stop reason: HOSPADM

## 2021-09-12 RX ORDER — ONDANSETRON 4 MG/1
4 TABLET, ORALLY DISINTEGRATING ORAL EVERY 6 HOURS PRN
Status: DISCONTINUED | OUTPATIENT
Start: 2021-09-12 | End: 2021-09-15 | Stop reason: HOSPADM

## 2021-09-12 RX ORDER — LIDOCAINE 4 G/G
1 PATCH TOPICAL EVERY 24 HOURS
Status: DISCONTINUED | OUTPATIENT
Start: 2021-09-12 | End: 2021-09-15 | Stop reason: HOSPADM

## 2021-09-12 RX ORDER — LANOLIN ALCOHOL/MO/W.PET/CERES
100 CREAM (GRAM) TOPICAL DAILY
Status: DISCONTINUED | OUTPATIENT
Start: 2021-09-12 | End: 2021-09-15 | Stop reason: HOSPADM

## 2021-09-12 RX ORDER — AMOXICILLIN 250 MG
1 CAPSULE ORAL 2 TIMES DAILY PRN
Status: DISCONTINUED | OUTPATIENT
Start: 2021-09-12 | End: 2021-09-15 | Stop reason: HOSPADM

## 2021-09-12 RX ORDER — POLYETHYLENE GLYCOL 3350 17 G/17G
17 POWDER, FOR SOLUTION ORAL DAILY PRN
Status: DISCONTINUED | OUTPATIENT
Start: 2021-09-12 | End: 2021-09-15 | Stop reason: HOSPADM

## 2021-09-12 RX ORDER — ONDANSETRON 2 MG/ML
4 INJECTION INTRAMUSCULAR; INTRAVENOUS EVERY 6 HOURS PRN
Status: DISCONTINUED | OUTPATIENT
Start: 2021-09-12 | End: 2021-09-15 | Stop reason: HOSPADM

## 2021-09-12 RX ORDER — AMOXICILLIN 250 MG
2 CAPSULE ORAL 2 TIMES DAILY PRN
Status: DISCONTINUED | OUTPATIENT
Start: 2021-09-12 | End: 2021-09-15 | Stop reason: HOSPADM

## 2021-09-12 RX ORDER — LORAZEPAM 1 MG/1
1-2 TABLET ORAL EVERY 30 MIN PRN
Status: DISCONTINUED | OUTPATIENT
Start: 2021-09-12 | End: 2021-09-15 | Stop reason: HOSPADM

## 2021-09-12 RX ORDER — FOLIC ACID 1 MG/1
1 TABLET ORAL DAILY
Status: DISCONTINUED | OUTPATIENT
Start: 2021-09-12 | End: 2021-09-15 | Stop reason: HOSPADM

## 2021-09-12 RX ORDER — NICOTINE POLACRILEX 4 MG
15-30 LOZENGE BUCCAL
Status: DISCONTINUED | OUTPATIENT
Start: 2021-09-12 | End: 2021-09-15 | Stop reason: HOSPADM

## 2021-09-12 RX ADMIN — SODIUM CHLORIDE 50 ML: 9 INJECTION, SOLUTION INTRAVENOUS at 19:53

## 2021-09-12 RX ADMIN — GABAPENTIN 200 MG: 100 CAPSULE ORAL at 20:56

## 2021-09-12 RX ADMIN — INSULIN ASPART 4 UNITS: 100 INJECTION, SOLUTION INTRAVENOUS; SUBCUTANEOUS at 17:02

## 2021-09-12 RX ADMIN — SODIUM CHLORIDE, PRESERVATIVE FREE: 5 INJECTION INTRAVENOUS at 03:14

## 2021-09-12 RX ADMIN — REMDESIVIR 100 MG: 100 INJECTION, POWDER, LYOPHILIZED, FOR SOLUTION INTRAVENOUS at 17:37

## 2021-09-12 RX ADMIN — MULTIPLE VITAMINS W/ MINERALS TAB 1 TABLET: TAB at 08:23

## 2021-09-12 RX ADMIN — GABAPENTIN 200 MG: 100 CAPSULE ORAL at 13:49

## 2021-09-12 RX ADMIN — INSULIN GLARGINE 10 UNITS: 100 INJECTION, SOLUTION SUBCUTANEOUS at 13:50

## 2021-09-12 RX ADMIN — INSULIN ASPART 5 UNITS: 100 INJECTION, SOLUTION INTRAVENOUS; SUBCUTANEOUS at 12:27

## 2021-09-12 RX ADMIN — ENOXAPARIN SODIUM 40 MG: 40 INJECTION SUBCUTANEOUS at 08:24

## 2021-09-12 RX ADMIN — FOLIC ACID 1 MG: 1 TABLET ORAL at 08:23

## 2021-09-12 RX ADMIN — REMDESIVIR 200 MG: 100 INJECTION, POWDER, LYOPHILIZED, FOR SOLUTION INTRAVENOUS at 01:45

## 2021-09-12 RX ADMIN — INSULIN ASPART 5 UNITS: 100 INJECTION, SOLUTION INTRAVENOUS; SUBCUTANEOUS at 20:58

## 2021-09-12 RX ADMIN — GABAPENTIN 200 MG: 100 CAPSULE ORAL at 08:23

## 2021-09-12 RX ADMIN — Medication 100 MG: at 08:23

## 2021-09-12 RX ADMIN — LIDOCAINE 1 PATCH: 246 PATCH TOPICAL at 08:21

## 2021-09-12 RX ADMIN — ACETAMINOPHEN 650 MG: 325 TABLET ORAL at 08:49

## 2021-09-12 RX ADMIN — DEXAMETHASONE 6 MG: 4 TABLET ORAL at 08:23

## 2021-09-12 RX ADMIN — INSULIN ASPART 5 UNITS: 100 INJECTION, SOLUTION INTRAVENOUS; SUBCUTANEOUS at 08:50

## 2021-09-12 NOTE — ED NOTES
[facility] ED Handoff  Report    ED Chief Complaint:     ED Diagnosis:  (U07.1,  J12.82) Pneumonia due to 2019 novel coronavirus  Comment:   Plan:     (D69.6) Thrombocytopenia (H)  Comment:   Plan:        PMH:    Past Medical History:   Diagnosis Date     Bipolar 1 disorder (H)      Chronic back pain      Diabetes mellitus (H)      Hepatitis C      Hepatitis C 2003     Hypertension      Substance abuse (H)         Code Status:  No Order     Falls Risk: Yes Band: Not applicable    Current Living Situation/Residence: lives alone     Elimination Status: Continent: Yes     Activity Level: Independent    Patients Preferred Language:  English     Needed: No    Vital Signs:  BP (!) 147/80   Pulse 77   Temp (!) 100.8  F (38.2  C) (Oral)   Resp 20   Wt 79.4 kg (175 lb)   SpO2 92%   BMI 32.01 kg/m       Cardiac Rhythm:     Pain Score: 8/10    Is the Patient Confused:  No    Last Food or Drink: 09/11/21 at     Focused Assessment:  Resp, cardiac      Tests Performed: Done: Labs and Imaging    Treatments Provided:  Meds, labs     Family Dynamics/Concerns: No    Family Updated On Visitor Policy: Yes    Plan of Care Communicated to Family: No    Who Was Updated about Plan of Care: n/a    Belongings Checklist Done and Signed by Patient: Yes    Covid: symptomatic, positive    Additional Information:     RN: Jade Ford   9/11/2021 11:54 PM

## 2021-09-12 NOTE — PROGRESS NOTES
[facility] Hospitalist Progress Note  [facility]  Summary:    60F year old female admitted on 9/11/2021 for EVAR, shortness of breath secondary to COVID-19 pneumonia.  She has a past history of treated hepatitis C, diet-controlled diabetes, hypertension, methamphetamine use, alcohol use, chronic back pain.    Assessment/Plan    #Confirmed COVID-19 infection complicated by viral pneumonia and acute hypoxic respiratory failure  Diagnosed on:  09/11  Duration of illness prior to admission:  About 7 days ago  - COVID therapeutics:   Decadron and remdesivir started 09/11  - Oxygenation/pulmonary: stable on 2L  - Labs: add on CRP and d-dimer  - At high risk of thrombotic complications due to COVID-19 disease.   Risk Category A, lovenox   - Discharge Anticoagulation: At discharge consider one of the following for 30 days and until the patient has returned to normal mobility:  Apixaban (Eliquis) 2.5 mg two times a day or Rivaroxaban (Xarelto) 10 mg once daily    #Thrombocytopenia likely due to cirrhosis  -monitor with DVT px, continue lovenox as long as plat > 50     #Diabetes - uncontrolled with steroids.  Last a1c 6.8  -start lantus 10m sliding scale insulin     #Essential hypertension - Not on any medication.  Use hydralazine as needed.  Was on Norvasc in past, consider restarting.     #Cirrhosis, Chronic hepatitis C - treated according to patient.  monitor LFTs with remdesivir.     History of meth use-check U tox pending     History of of moderate to heavy alcohol use-check alcohol level, start withdrawal protocol.  No signs of withdrawal.     Tobacco abuse-counseled quitting.  Gum as needed     Chronic low back pain-no red flags, nonradicular.  x-ray pending.  avoid narcotics, start lidocaine patch     4 mm right lung nodule on CT-outpatient follow-up, discussed with patient     Diarrhea-unremarkable abdominal exam, LFTs.  Likely secondary to Covid.  Check C. difficile, stool culture if  recurs      Checklist:  Code Status: Full Code    Diet: Consistent Carb 75 grams CHO per Meal Diet    Todd Catheter: Not present  Central Lines: None      Overnight Events/Subjective/Notable results:  Fever 100.8 yesterday.  Overall feeling better.  Breathing improved.  Dry cough.    4 point ROS otherwise negative    Objective    Vital signs in last 24 hours  Temp:  [98.4  F (36.9  C)-100.8  F (38.2  C)] 98.8  F (37.1  C)  Pulse:  [68-82] 71  Resp:  [18-37] 22  BP: (137-164)/(77-94) 149/88  SpO2:  [86 %-97 %] 97 % O2 Device: Nasal cannula    Weight:   174 lbs 2.61 oz    Intake/Output last 3 shifts  I/O last 3 completed shifts:  In: -   Out: 200 [Urine:200]  Body mass index is 31.85 kg/m .    Physical Exam  General:  Alert, cooperative, no distress,  Appears stated age, chronically ill appearing  Neurologic:  oriented, facialsymmetry preserved, fluent speech.   Psych: calm, mood and affect appropriate to situation  HEENT:  Anicteric, MMM, unremarkable dentition  CV:  no edema  Lungs: Easyrespirations,  Limited ability to auscultate due to need for PPE and air filtration system.  No audible wheezing or rhonchi.  Abd: soft, NT, normoactive BS  Skin: no rashes noted on exposed skin. Color and turgor normal  Central Lines and Tubes: None (no todd, CVC, feeding tubes)      I have reviewed all labs, medications, imaging studies in the last 24 hours.  Pertinent findings&changes discussed above.    Data     Armaan Castillo MD  Internal Medicine Hospitalist

## 2021-09-12 NOTE — H&P
[facility]    History and Physical - Hospitalist Service       Date of Admission:  9/11/2021    Active Problems:    Chronic hepatitis C virus infection (H)    Diabetes mellitus (H)    Essential hypertension    Thrombocytopenia (H)    Pneumonia due to 2019 novel coronavirus    Assessment & Plan      Vitaly Roach is a 60 year old female admitted on 9/11/2021 for EVAR, shortness of breath secondary to COVID-19 pneumonia.  She has a past history of treated hepatitis C, diet-controlled diabetes, hypertension, methamphetamine use, alcohol use, chronic back pain.    # Confirmed COVID-19 infection    # Acute Hypoxic Respiratory Failure secondary to COVID-19 infection  # Viral Pneumonia secondary to COVID-19 infection    - Admit to medical floor with continuous pulse oximetry, COVID-19 special precautions  - Oxygen: continue current support with nasal cannula at 2 L/min; titrate to keep SpO2 between 90-96%  - Labs: standard [facility] admission labs ordered (CBC with diff, CMP, retic count, LDH, troponin, BNP, CK, INR/PT, PTT, D-dimer, fibrinogen, antithrombin, ferritin, CRP, IL-6)   - Imaging: chest CT with contrast ordered  - Breathing treatments: no inhalers needed; avoid nebulizers in favor of MDIs   - IV fluids: not indicated at this time  - Antibiotics: not indicated   - COVID-Focused Medications: Dexamethasone 6 mg x 10 days or until hospital discharge, started on 9/11 and Remdesivir x 5 days or until hospital discharge, started on 9/12  - DVT Prophylaxis: at high risk of thrombotic complications due to COVID-19 (DDimer = 1.01 ug/mL FEU (Ref range: 0.00 - 0.50 ug/mL FEU) ).          - PROPHYLACTIC dosing: lovenox 40mg daily        - consider anticoag on discharge for 30 days & until return to normal mobility    Became hypoxic on transfer to the floor.  Start dexamethasone, start remdesivir.  Start I-S, continuous O2 monitoring.  Start weaning.    Thrombocytopenia-  Suspect secondary to hepatitis  C, mild.  Also could be from infection.  Per up-to-date okay to continue Lovenox DVT prophylaxis given high risk Covid, follow platelets closely.  Discontinue if platelets go less than 50.  Peripheral smear.    Diabetes-  Type II, A1c a few months ago was 6.8, start sliding scale, may need to start NPH versus Lantus    Essential hypertension-accelerated.  Not on any medication.  Use hydralazine as needed.  Was on Norvasc, consider restarting.    Chronic hepatitis C-treated according to patient.  Unremarkable LFTs..    History of meth use-check U tox.    History of of moderate to heavy alcohol use-check alcohol level, start withdrawal protocol.  No signs of withdrawal.    Tobacco abuse-counseled quitting.  Gum as needed    Chronic low back pain-no red flags, nonradicular.  Check x-ray, UA, conservative management, avoid narcotics, start lidocaine patch    4 mm right lung nodule on CT-outpatient follow-up, discussed with patient    Diarrhea-unremarkable abdominal exam, LFTs.  Likely secondary to Covid.  Check C. difficile, stool culture     Diet:  Regular  DVT Prophylaxis: Enoxaparin (Lovenox) SQ  Christian Catheter: Not present  Central Lines: None  Code Status:  Code    Clinically Significant Risk Factors Present on Admission              # Thrombocytopenia: Plts = 91 10e3/uL (Ref range: 150 - 450 10e3/uL) on admission, will monitor for bleeding      Disposition Plan   Expected discharge:  in 2 to 4 days recommended to prior living arrangement once Temps improved.     The patient's care was discussed with the Bedside Nurse and Patient.    TOYA RYAN MD  [facility]  Securely message with the [employer] Web Console (learn more here)  Text page via [employer] Paging/Directory      ______________________________________________________________________    Chief Complaint   Fever    History is obtained from the patient    History of Present Illness   Vitaly Roach is a 60 year old female who is being  admitted for fever, shortness of breath and cough with positive Covid.  Patient started to have symptoms of fatigue, fever, shortness of breath and cough approximately 7 days ago.  She also lost her sense of taste.  Her cough is productive with mucus that is green.  She also has had diarrhea for 2 days but denies any abdominal pain.  She denies any chest pain.  She has chronic low back pain for over a year that is nonradicular and unchanged.  She has not been vaccinated.  She has a past history of treated hepatitis C, diet-controlled diabetes, hypertension, ongoing methamphetamine use, last used yesterday, ongoing alcohol use.  She is not taking any medications.    Family history positive for heart disease in her mother    Habits patient smokes 1 pack of cigarettes a week, drinks alcohol daily, one third of a container of vodka, smokes methamphetamine intermittently, last dose yesterday, uses it for back pain.    Social history patient is , has 3 children, does not work    Review of Systems  The 10 point Review of Systems is negative other than noted in the HPI or here.     Past Medical History    I have reviewed this patient's medical history and updated it with pertinent information if needed.   Past Medical History:   Diagnosis Date     Bipolar 1 disorder (H)      Chronic back pain      Diabetes mellitus (H)      Hepatitis C      Hepatitis C 2003     Hypertension      Substance abuse (H)        Past Surgical History   I have reviewed this patient's surgical history and updated it with pertinent information if needed.  Past Surgical History:   Procedure Laterality Date     HYSTERECTOMY       TONSILLECTOMY, ADENOIDECTOMY, MYRINGOTOMY, INSERT TUBE BILATERAL, COMBINED       TUBAL LIGATION         Social History   I have reviewed this patient's social history and updated it with pertinent information if needed.  Social History     Tobacco Use     Smoking status: Current Every Day Smoker     Smokeless tobacco:  Never Used   Substance Use Topics     Alcohol use: No     Drug use: No       Family History   I have reviewed this patient's family history and updated it with pertinent information if needed.  Family History   Problem Relation Age of Onset     Fibromyalgia Mother      Heart Disease Father      Breast Cancer Maternal Grandmother        Prior to Admission Medications   Prior to Admission Medications   Prescriptions Last Dose Informant Patient Reported? Taking?   DM-Phenylephrine-acetaminophen (VICKS DAYQUIL COLD & FLU) 10-5-325 MG CAPS 9/11/2021 at Unknown time  Yes Yes   Sig: Take 1 capsule by mouth every 6 hours as needed      Facility-Administered Medications: None     Allergies   Allergies   Allergen Reactions     Morphine Sulfate Nausea     Vicodin [Hydrocodone-Acetaminophen] Nausea       Physical Exam   Vital Signs: Temp: (!) 100.8  F (38.2  C) Temp src: Oral BP: (!) 146/80 Pulse: 73   Resp: (!) 37 SpO2: 91 %      Weight: 175 lbs 0 oz    Physical Exam:  Temp:  [100.8  F (38.2  C)] 100.8  F (38.2  C)  Pulse:  [70-82] 73  Resp:  [18-37] 37  BP: (137-164)/(77-94) 146/80  SpO2:  [90 %-97 %] 91 %  BP (!) 146/80   Pulse 73   Temp (!) 100.8  F (38.2  C) (Oral)   Resp (!) 37   Wt 79.4 kg (175 lb)   SpO2 91%   BMI 32.01 kg/m    General appearance: alert, appears stated age and cooperative  Head: Normocephalic, without obvious abnormality, atraumatic  Eyes: Clear conjuctiva  Neck: no JVD and supple, symmetrical, trachea midline  Lungs: clear to auscultation bilaterally  Heart: regular rate and rhythm, S1, S2 normal, no murmur, click, rub or gallop  Abdomen: soft, non-tender; bowel sounds normal; no masses,  no organomegaly  Extremities: Richard's sign is negative, no sign of DVT  Skin: Skin color, texture, turgor normal. No rashes or lesions  Neurologic: Mental status: Alert, oriented, thought content appropriate  Cranial nerves: normal  Sensory: normal  Motor:grossly normal          Data   Data reviewed today: I  reviewed all medications, new labs and imaging results over the last 24 hours. I personally reviewed the EKG tracing showing Sinus rhythm, QTC prolonged at 481 but improved, last QTC was 521, no pathologic ST elevation or depression and the chest CT image(s) showing PE, bilateral infiltrates.    Recent Labs   Lab 09/12/21  0215 09/12/21  0100 09/11/21 2048 09/11/21 2047   WBC  --   --  2.7*  --    HGB  --   --  14.8  --    MCV  --   --  90  --    PLT  --   --  91*  --    INR  --  1.08  --  1.01   NA  --   --  134*  --    POTASSIUM  --   --  3.8  --    CHLORIDE  --   --  101  --    CO2  --   --  22  --    BUN  --   --  9  --    CR  --   --  0.77  --    ANIONGAP  --   --  11  --    SCARLET  --   --  8.6  --    *  --  116  --    ALBUMIN  --   --  3.2*  --    PROTTOTAL  --   --  6.7  --    BILITOTAL  --   --  0.4  --    ALKPHOS  --   --  82  --    ALT  --   --  20  --    AST  --   --  31  --      2.7 (L)    \    14.8    /    91 (L)   N 56    L N/A    134 (L)    101    9 /   ------------------------------------ 188 (H)   ALT 20   AST 31   AP 82   ALB 3.2 (L)   Ca 8.6  3.8    22    0.77 \    % RETIC N/A     (H)  Troponin N/A    BNP N/A    CK N/A  INR 1.08   PTT 32    D-dimer 1.01 (H)    Fibrinogen 466    Antithrombin N/A  Ferritin N/A  CRP 1.3 (H)    IL-6 N/A  Recent Results (from the past 24 hour(s))   CT Chest Pulmonary Embolism w Contrast    Narrative    EXAM: CT CHEST PULMONARY EMBOLISM W CONTRAST  LOCATION: [facility]  DATE/TIME: 9/11/2021 9:36 PM    INDICATION: Cough, shortness of breath.  COMPARISON: 6/23/2021.  TECHNIQUE: CT chest pulmonary angiogram during arterial phase injection of IV contrast. Multiplanar reformats and MIP reconstructions were performed. Dose reduction techniques were used.   CONTRAST: Isovue 370 100 ml.    FINDINGS:  ANGIOGRAM CHEST: Pulmonary arteries are normal caliber and negative for pulmonary emboli. Thoracic aorta is negative for dissection. No  CT evidence of right heart strain.    LUNGS AND PLEURA: There are patchy groundglass bilateral pulmonary infiltrates most pronounced in the periphery of the lungs. Findings compatible with acute pneumonitis and could be seen with COVID-19 pneumonitis. Clinical correlation. Technically   indeterminate 4 mm subpleural nodule in the right middle lobe on series 7 image 172 is unchanged. No pleural effusions.    MEDIASTINUM/AXILLAE: Normal.    CORONARY ARTERY CALCIFICATION: None.    UPPER ABDOMEN: Limited visualization of the upper abdomen. Question some nodular contour of the liver with underlying hepatic parenchymal disease not excluded.    MUSCULOSKELETAL: Subacute appearing incompletely healed right second through fifth rib fractures anterolaterally. Similar findings involving the left second and third ribs anterolaterally. Question of subacute appearing nondisplaced midsternal fracture.   These findings are new compared to 6/23/2021 but again appears subacute in nature.      Impression    IMPRESSION:  1.  Negative for pulmonary embolism.    2.  Patchy bilateral groundglass pulmonary infiltrates compatible with acute pneumonitis and could be seen with COVID-19 pneumonitis.    3.  Indeterminate 4 mm nodule right middle lobe. Recommend follow-up CT in one year if there are risk factors present such as history of smoking. If no risk factors are present, no specific follow-up needed.    4.  Question some nodular contour of the liver suggesting underlying hepatic parenchymal disease.    5.  Subacute appearing incompletely healed nondisplaced fractures involving the right second through fifth ribs, left second and third ribs, and mid sternum. These findings are all new compared to 6/23/2021 but again appears subacute in nature. Clinical   correlation.

## 2021-09-12 NOTE — PLAN OF CARE
Problem: Adult Inpatient Plan of Care  Goal: Patient-Specific Goal (Individualized)  Outcome: Improving   Pt on 2 liters oxygen.  Sating in the low to mid 90s.  Lung sounds diminished, but clear at this time.  Encourage IS and deep breathing.  Occasional dry cough.  Does have some lower left back pain.  Given lidocaine patch.  Pt stated it helped some.

## 2021-09-12 NOTE — PLAN OF CARE
Problem: Gas Exchange Impaired  Goal: Optimal Gas Exchange  Outcome: Improving   Pt breathing regular, no dyspnea noted at rest.  Sating lower 90s on 2 liters NC.  Lungs sounds diminished.  Infrequent dry cough.

## 2021-09-12 NOTE — ED PROVIDER NOTES
Hours  Emergency Department Encounter     Evaluation Date & Time:   2021  7:05 PM    CHIEF COMPLAINT:  Generalized Body Aches and Fever      Triage Note:The pt presents for evaluation of generalized weakness, loss of taste/smell and fevers for about 8 days. The pt notes that last month she accidentally overdose on a medication and was given CPR, breaking ribs, leading to pneumonia.         Impression and Plan       FINAL IMPRESSION:    ICD-10-CM    1. Pneumonia due to 2019 novel coronavirus  U07.1     J12.82    2. Thrombocytopenia (H)  D69.6          ED COURSE & MEDICAL DECISION MAKIN:09 PM I met with the patient for the initial interview and physical examination. Discussed plan for treatment and workup in the ED.    8:49 PM I rechecked and updated the patient.   9:32 PM I rechecked and updated the patient.   10:13 PM I discussed the case with hospitalist, Dr Sandoval, who accepts the patient     60 year old female, history of accidental opiate overdose with cardiac arrest (), DM, HTN, alcohol abuse and Hepatitis C, who presents for evaluation of URI symptoms x 7 days with headaches, fatigue with generalized weakness, body aches, cough and subjective fevers with chills. She reports associated SOB with chest pain due to cough and fractures sustained from CPR in . She has had nausea with no associated vomiting, abdominal pain or diarrhea. She also reports loss of taste and smell, however states that this occurred back in late .     She has not been vaccinated for COVID.     On exam, she has no respiratory distress with somewhat diminished, but symmetrical, and slightly coarse breath sounds diffusely; no audible wheezes or rales.     Patient placed on cardiac monitor, IV access established and blood sent for labs.    EKG performed and demonstrated NSR with prolonged QTc (481ms) and no ST-T wave changes consistent with ACS or pericarditis; troponin WNL (<0.01).    Covid test returned  positive.    CTA chest performed and demonstrated:  1.  Negative for PE   2.  Patchy BL groundglass pulmonary infiltrates compatible with acute pneumonitis and could be seen with COVID-19 pneumonitis.  3.  Indeterminate 4 mm nodule RML; recommend follow-up CT in one year if there are risk factors present such as history of smoking.   4.  Question some nodular contour of the liver suggesting underlying hepatic parenchymal disease.   5.  Subacute appearing incompletely healed nondisplaced fractures involving the right 2nd-5th ribs, left 2nd-3rd ribs and mid sternum. These findings are all          new compared to 6/23/2021 but again appears subacute in nature.     Patient with borderline hypoxia (O2 sats 92-97%) for which she was given 6 mg IV Decadron.    Lactate WNL (1.2) with mildly elevated CRP (1.3). D-dimer mildly elevated (1.01).    Labs otherwise remarkable for leukopenia (WBC 2.7) and thrombocytopenia (91). She has no significant electrolyte derangements, renal or hepatic impairment. Coags WNL.     Given borderline hypoxia and worsening overall generalized weakness and decreased p.o. intake, decision for further supportive cares. Patient hemodynamically stable throughout ED course.        At the conclusion of the encounter I discussed the results of all the tests and the disposition. The questions were answered. The patient acknowledged understanding and was agreeable with the care plan.      PPE: Provider wore gloves, N95 mask, eye protection, surgical cap, and paper mask.    MEDICATIONS GIVEN IN THE EMERGENCY DEPARTMENT:  Medications   ketorolac (TORADOL) injection 15 mg (15 mg Intravenous Given 9/11/21 2059)   dexamethasone PF (DECADRON) injection 6 mg (6 mg Intravenous Given 9/11/21 2206)   iopamidol (ISOVUE-370) solution 100 mL (100 mLs Intravenous Given 9/11/21 2146)       NEW PRESCRIPTIONS STARTED AT TODAY'S ED VISIT:  New Prescriptions    No medications on file       NAVIN Roach is a 60  year old female with a pertinent history of accidental opiate overdose with cardiac arrest (June), DM, HTN, alcohol abuse and Hepatitis C, who presents to this ED via walk-in for evaluation of generalized body aches and fever.    The patient reports URI symptoms x 7 days with headaches, fatigue with generalized weakness, body aches, cough and subjective fevers with chills. She does report associated SOB. She also has chest pain with cough and secondary to sternal fracture sustained in June after CPR for accidental opioid overdose. She reports nausea without vomiting; no abdominal pain or diarrhea. She also reports loss of taste and smell, however states that this occurred back in late June.     She reports that she did not take the antibiotics prescribed for her pneumonia back in June.     She has not been vaccinated for COVID.     REVIEW OF SYSTEMS:  All other systems reviewed and are negative.      Medical History     Past Medical History:   Diagnosis Date     Bipolar 1 disorder (H)      Chronic back pain      Diabetes mellitus (H)      Hepatitis C      Hepatitis C 2003     Hypertension      Substance abuse (H)        Past Surgical History:   Procedure Laterality Date     HYSTERECTOMY       TONSILLECTOMY, ADENOIDECTOMY, MYRINGOTOMY, INSERT TUBE BILATERAL, COMBINED       TUBAL LIGATION         Family History   Problem Relation Age of Onset     Fibromyalgia Mother      Heart Disease Father      Breast Cancer Maternal Grandmother        Social History     Tobacco Use     Smoking status: Current Every Day Smoker     Smokeless tobacco: Never Used   Substance Use Topics     Alcohol use: No     Drug use: No       DM-Phenylephrine-acetaminophen (VICKS DAYQUIL COLD & FLU) 10-5-325 MG CAPS        Physical Exam     First Vitals:  Patient Vitals for the past 24 hrs:   BP Temp Temp src Pulse Resp SpO2 Weight   09/11/21 2210 (!) 147/80 -- -- 77 20 92 % --   09/11/21 2130 -- -- -- 79 20 92 % --   09/11/21 2120 -- -- -- 82 28 93  % --   09/11/21 2045 (!) 157/77 -- -- 79 25 95 % --   09/11/21 1945 (!) 162/83 -- -- 78 -- 93 % --   09/11/21 1930 (!) 140/83 -- -- 80 -- 97 % --   09/11/21 1915 (!) 155/90 -- -- 80 -- 96 % --   09/11/21 1735 137/80 (!) 100.8  F (38.2  C) Oral 81 18 93 % 79.4 kg (175 lb)       PHYSICAL EXAM:   Physical Exam    GENERAL: Awake, alert.  In mild acute distress; patient is tearful.   HEENT: Normocephalic, atraumatic. Pupils equal, round and reactive. Conjunctiva normal.  NECK: No stridor.  PULMONARY: No respiratory distress. Somewhat diminished, but symmetrical, and slightly coarse breath sounds diffusely. No audible wheezes or rales.   CARDIO: Regular rate and rhythm.  No significant murmur, rub or gallop.  Radial pulses strong and symmetrical.  ABDOMINAL: Abdomen soft, non-distended and non-tender to palpation.    EXTREMITIES: No lower extremity swelling or edema.      NEURO: Alert and oriented to person, place and time.  Cranial nerves grossly intact.  No focal motor deficit.  PSYCH: Patient is tearful.   SKIN: No rashes.     Results     LAB:  All pertinent labs reviewed and interpreted  Labs Ordered and Resulted from Time of ED Arrival Up to the Time of Departure from the ED   COVID-19 VIRUS (CORONAVIRUS) BY PCR - Abnormal; Notable for the following components:       Result Value    SARS CoV2 PCR Positive (*)     All other components within normal limits    Narrative:     Testing was performed using the imelda  SARS-CoV-2 & Influenza A/B Assay on the im[employer]da  Kezia  System.  This test should be ordered for the detection of SARS-COV-2 in individuals who meet SARS-CoV-2 clinical and/or epidemiological criteria. Test performance is unknown in asymptomatic patients.  This test is for in vitro diagnostic use under the FDA EUA for laboratories certified under CLIA to perform moderate and/or high complexity testing. This test has not been FDA cleared or approved.  A negative test does not rule out the presence of PCR inhibitors  in the specimen or target RNA in concentration below the limit of detection for the assay. The possibility of a false negative should be considered if the patient's recent exposure or clinical presentation suggests COVID-19.  [facility] are certified under the Clinical Laboratory Improvement Amendments of 1988 (CLIA-88) as qualified to perform moderate and/or high complexity laboratory testing.   BASIC METABOLIC PANEL - Abnormal; Notable for the following components:    Sodium 134 (*)     All other components within normal limits   CRP INFLAMMATION - Abnormal; Notable for the following components:    CRP 1.3 (*)     All other components within normal limits   HEPATIC FUNCTION PANEL - Abnormal; Notable for the following components:    Albumin 3.2 (*)     All other components within normal limits   CBC WITH PLATELETS AND DIFFERENTIAL - Abnormal; Notable for the following components:    WBC Count 2.7 (*)     Platelet Count 91 (*)     All other components within normal limits   DIFFERENTIAL - Abnormal; Notable for the following components:    Absolute Neutrophils 1.5 (*)     All other components within normal limits   LACTIC ACID WHOLE BLOOD - Normal   INR - Normal   PARTIAL THROMBOPLASTIN TIME - Normal   TROPONIN I - Normal   MAGNESIUM - Normal   ETHYL ALCOHOL LEVEL - Normal   CBC WITH PLATELETS & DIFFERENTIAL    Narrative:     The following orders were created for panel order CBC with platelets + differential.  Procedure                               Abnormality         Status                     ---------                               -----------         ------                     CBC with platelets and d...[[id number]]  Abnormal            Final result               Manual Differential[[id number]]          Abnormal            Final result                 Please view results for these tests on the individual orders.   D DIMER QUANTITATIVE   CARDIAC CONTINUOUS MONITORING   MAY SALINE LOCK IV   CALL    BLOOD CULTURE   BLOOD CULTURE       RADIOLOGY:  CT Chest Pulmonary Embolism w Contrast   Preliminary Result   IMPRESSION:   1.  Negative for pulmonary embolism.      2.  Patchy bilateral groundglass pulmonary infiltrates compatible with acute pneumonitis and could be seen with COVID-19 pneumonitis.      3.  Indeterminate 4 mm nodule right middle lobe. Recommend follow-up CT in one year if there are risk factors present such as history of smoking. If no risk factors are present, no specific follow-up needed.      4.  Question some nodular contour of the liver suggesting underlying hepatic parenchymal disease.      5.  Subacute appearing incompletely healed nondisplaced fractures involving the right second through fifth ribs, left second and third ribs, and mid sternum. These findings are all new compared to 2021 but again appears subacute in nature. Clinical    correlation.          EC2021, 20:36; NSR with rate of 78 bpm; prolonged QTc (481ms); normal conduction; no ST-T wave changes consistent with ACS or pericarditis; compared to previous EKG dated 2021, the QTc has shortened    EKG independently reviewed and interpreted by Adele Conner MD        I, Johana Barraza, am serving as a scribe to document services personally performed by Adele Conner MD based on my observation and the provider's statements to me. I, Adele Conner MD attest that Johana Barraza is acting in a scribe capacity, has observed my performance of the services and has documented them in accordance with my direction.    Adele Conner MD  Emergency Medicine  [facility] EMERGENCY DEPARTMENT           Adele Conner MD  [phone]

## 2021-09-12 NOTE — PHARMACY-ADMISSION MEDICATION HISTORY
Pharmacy Note - Admission Medication History    Pertinent Provider Information: Patient states that she should be on amlodipine and metformin but has not taken them since they were prescribed to her.  Taking Dayquil for her current symptoms.     ______________________________________________________________________    Prior To Admission (PTA) med list completed and updated in EMR.       PTA Med List   Medication Sig Last Dose     DM-Phenylephrine-acetaminophen (VICKS DAYQUIL COLD & FLU) 10-5-325 MG CAPS Take 1 capsule by mouth every 6 hours as needed 9/11/2021 at Unknown time       Information source(s): Patient  Method of interview communication: phone    Summary of Changes to PTA Med List  New: dayquil  Discontinued: n/a  Changed: n/a    Patient was asked about OTC/herbal products specifically.  PTA med list reflects this.    In the past week, patient estimated taking medication this percent of the time:  less than 50% due to doesn't want ot take them.    Allergies were reviewed, assessed, and updated with the patient.      Patient did not bring any medications to the hospital and can't retrieve from home. No multi-dose medications are available for use during hospital stay.     The information provided in this note is only as accurate as the sources available at the time of the update(s).    Thank you for the opportunity to participate in the care of this patient.    Chante Garcia RPH  9/11/2021 9:28 PM

## 2021-09-12 NOTE — PLAN OF CARE
Pt lethargic and very drowsy, pt not alert enough to take gabapentin. Scoring a 1 on CIWA.   Gabapentin was held.  Hospitalist aware. Scheduled for 3 more times today.

## 2021-09-13 ENCOUNTER — APPOINTMENT (OUTPATIENT)
Dept: RADIOLOGY | Facility: HOSPITAL | Age: 61
DRG: 177 | End: 2021-09-13
Attending: HOSPITALIST
Payer: COMMERCIAL

## 2021-09-13 LAB
ALBUMIN SERPL-MCNC: 3.1 G/DL (ref 3.5–5)
ALP SERPL-CCNC: 87 U/L (ref 45–120)
ALT SERPL W P-5'-P-CCNC: 16 U/L (ref 0–45)
AMPHETAMINES UR QL SCN: ABNORMAL
ANION GAP SERPL CALCULATED.3IONS-SCNC: 8 MMOL/L (ref 5–18)
AST SERPL W P-5'-P-CCNC: 21 U/L (ref 0–40)
BARBITURATES UR QL: ABNORMAL
BENZODIAZ UR QL: ABNORMAL
BILIRUB DIRECT SERPL-MCNC: 0.1 MG/DL
BILIRUB SERPL-MCNC: 0.4 MG/DL (ref 0–1)
BUN SERPL-MCNC: 17 MG/DL (ref 8–22)
C REACTIVE PROTEIN LHE: 1 MG/DL (ref 0–0.8)
CALCIUM SERPL-MCNC: 9 MG/DL (ref 8.5–10.5)
CANNABINOIDS UR QL SCN: ABNORMAL
CHLORIDE BLD-SCNC: 106 MMOL/L (ref 98–107)
CO2 SERPL-SCNC: 21 MMOL/L (ref 22–31)
COCAINE UR QL: ABNORMAL
CREAT SERPL-MCNC: 0.58 MG/DL (ref 0.6–1.1)
CREAT UR-MCNC: 39 MG/DL
D DIMER PPP FEU-MCNC: 0.58 UG/ML FEU (ref 0–0.5)
ERYTHROCYTE [DISTWIDTH] IN BLOOD BY AUTOMATED COUNT: 11.5 % (ref 10–15)
FIBRINOGEN PPP-MCNC: 467 MG/DL (ref 170–490)
GFR SERPL CREATININE-BSD FRML MDRD: >90 ML/MIN/1.73M2
GLUCOSE BLD-MCNC: 248 MG/DL (ref 70–125)
GLUCOSE BLDC GLUCOMTR-MCNC: 229 MG/DL (ref 70–99)
GLUCOSE BLDC GLUCOMTR-MCNC: 297 MG/DL (ref 70–99)
GLUCOSE BLDC GLUCOMTR-MCNC: 314 MG/DL (ref 70–99)
GLUCOSE BLDC GLUCOMTR-MCNC: 328 MG/DL (ref 70–99)
GLUCOSE BLDC GLUCOMTR-MCNC: 350 MG/DL (ref 70–99)
HCT VFR BLD AUTO: 41.9 % (ref 35–47)
HGB BLD-MCNC: 14.9 G/DL (ref 11.7–15.7)
HOLD SPECIMEN: NORMAL
MCH RBC QN AUTO: 31.5 PG (ref 26.5–33)
MCHC RBC AUTO-ENTMCNC: 35.6 G/DL (ref 31.5–36.5)
MCV RBC AUTO: 89 FL (ref 78–100)
OPIATES UR QL SCN: ABNORMAL
OXYCODONE UR QL: ABNORMAL
PCP UR QL SCN: ABNORMAL
PLATELET # BLD AUTO: 136 10E3/UL (ref 150–450)
POTASSIUM BLD-SCNC: 4.2 MMOL/L (ref 3.5–5)
PROT SERPL-MCNC: 6.4 G/DL (ref 6–8)
RBC # BLD AUTO: 4.73 10E6/UL (ref 3.8–5.2)
SODIUM SERPL-SCNC: 135 MMOL/L (ref 136–145)
WBC # BLD AUTO: 7.7 10E3/UL (ref 4–11)

## 2021-09-13 PROCEDURE — 72100 X-RAY EXAM L-S SPINE 2/3 VWS: CPT

## 2021-09-13 PROCEDURE — 250N000012 HC RX MED GY IP 250 OP 636 PS 637: Performed by: HOSPITALIST

## 2021-09-13 PROCEDURE — 120N000001 HC R&B MED SURG/OB

## 2021-09-13 PROCEDURE — 250N000013 HC RX MED GY IP 250 OP 250 PS 637: Performed by: FAMILY MEDICINE

## 2021-09-13 PROCEDURE — 80307 DRUG TEST PRSMV CHEM ANLYZR: CPT | Performed by: FAMILY MEDICINE

## 2021-09-13 PROCEDURE — 85384 FIBRINOGEN ACTIVITY: CPT | Performed by: FAMILY MEDICINE

## 2021-09-13 PROCEDURE — 80053 COMPREHEN METABOLIC PANEL: CPT | Performed by: HOSPITALIST

## 2021-09-13 PROCEDURE — 250N000011 HC RX IP 250 OP 636: Performed by: FAMILY MEDICINE

## 2021-09-13 PROCEDURE — 85379 FIBRIN DEGRADATION QUANT: CPT | Performed by: FAMILY MEDICINE

## 2021-09-13 PROCEDURE — 99233 SBSQ HOSP IP/OBS HIGH 50: CPT | Performed by: HOSPITALIST

## 2021-09-13 PROCEDURE — 36415 COLL VENOUS BLD VENIPUNCTURE: CPT | Performed by: FAMILY MEDICINE

## 2021-09-13 PROCEDURE — 250N000012 HC RX MED GY IP 250 OP 636 PS 637: Performed by: FAMILY MEDICINE

## 2021-09-13 PROCEDURE — 86141 C-REACTIVE PROTEIN HS: CPT | Performed by: FAMILY MEDICINE

## 2021-09-13 PROCEDURE — 250N000009 HC RX 250: Performed by: FAMILY MEDICINE

## 2021-09-13 PROCEDURE — 258N000003 HC RX IP 258 OP 636: Performed by: FAMILY MEDICINE

## 2021-09-13 PROCEDURE — 85027 COMPLETE CBC AUTOMATED: CPT | Performed by: FAMILY MEDICINE

## 2021-09-13 RX ADMIN — MULTIPLE VITAMINS W/ MINERALS TAB 1 TABLET: TAB at 08:33

## 2021-09-13 RX ADMIN — Medication 100 MG: at 08:33

## 2021-09-13 RX ADMIN — GABAPENTIN 200 MG: 100 CAPSULE ORAL at 15:20

## 2021-09-13 RX ADMIN — SODIUM CHLORIDE 50 ML: 9 INJECTION, SOLUTION INTRAVENOUS at 19:44

## 2021-09-13 RX ADMIN — ENOXAPARIN SODIUM 40 MG: 40 INJECTION SUBCUTANEOUS at 08:32

## 2021-09-13 RX ADMIN — INSULIN GLARGINE 10 UNITS: 100 INJECTION, SOLUTION SUBCUTANEOUS at 08:29

## 2021-09-13 RX ADMIN — INSULIN ASPART 4 UNITS: 100 INJECTION, SOLUTION INTRAVENOUS; SUBCUTANEOUS at 17:28

## 2021-09-13 RX ADMIN — ACETAMINOPHEN 650 MG: 325 TABLET ORAL at 02:41

## 2021-09-13 RX ADMIN — REMDESIVIR 100 MG: 100 INJECTION, POWDER, LYOPHILIZED, FOR SOLUTION INTRAVENOUS at 17:48

## 2021-09-13 RX ADMIN — LIDOCAINE 1 PATCH: 246 PATCH TOPICAL at 02:46

## 2021-09-13 RX ADMIN — INSULIN ASPART 6 UNITS: 100 INJECTION, SOLUTION INTRAVENOUS; SUBCUTANEOUS at 17:29

## 2021-09-13 RX ADMIN — OLANZAPINE 5 MG: 5 TABLET, ORALLY DISINTEGRATING ORAL at 18:24

## 2021-09-13 RX ADMIN — FOLIC ACID 1 MG: 1 TABLET ORAL at 08:32

## 2021-09-13 RX ADMIN — GABAPENTIN 200 MG: 100 CAPSULE ORAL at 08:32

## 2021-09-13 RX ADMIN — DEXAMETHASONE 6 MG: 4 TABLET ORAL at 08:32

## 2021-09-13 RX ADMIN — INSULIN ASPART 4 UNITS: 100 INJECTION, SOLUTION INTRAVENOUS; SUBCUTANEOUS at 11:47

## 2021-09-13 RX ADMIN — INSULIN ASPART 4 UNITS: 100 INJECTION, SOLUTION INTRAVENOUS; SUBCUTANEOUS at 21:24

## 2021-09-13 RX ADMIN — INSULIN ASPART 2 UNITS: 100 INJECTION, SOLUTION INTRAVENOUS; SUBCUTANEOUS at 08:30

## 2021-09-13 RX ADMIN — GABAPENTIN 200 MG: 100 CAPSULE ORAL at 21:23

## 2021-09-13 NOTE — SIGNIFICANT EVENT
Significant Event Note    Time of event: 9:18 PM September 12, 2021    Description of event:  Called by nursing stating blood sugar 416.  Reported no symptoms.  Since likely steroid-induced.  Patient received insulin per sliding scale.  Advised to check blood sugar in 2 to 3 hours and call MD if blood sugar is still elevated.    Discussed with: bedside nurse    Addy QUIROS MD

## 2021-09-13 NOTE — PLAN OF CARE
Problem: Adult Inpatient Plan of Care  Goal: Optimal Comfort and Wellbeing  Outcome: Improving    Blood sugar check @ 0000 328, no need to re-page MD since <400 per nursing communication. Pt continues have O2 sats>90% on 2L NC. Lung sounds diminished, dry non productive occasional cough.  Lidocaine patch applied on lower back and Tylenol given x1 for back pain.  Pt scored a 4 and 2 on CIWA r/t anxiety. Used purewick.  Remains on continuous pulse oximetry with bed alarm on.

## 2021-09-13 NOTE — PROGRESS NOTES
[facility]    Medicine Progress Note - Hospitalist Service       Date of Admission:  9/11/2021    Assessment & Plan           60 year old female admitted on 9/11/2021 for EVAR, shortness of breath secondary to COVID-19 pneumonia.  She has a past history of treated hepatitis C, diet-controlled diabetes, hypertension, methamphetamine use, alcohol use, chronic back pain.     Assessment/Plan     #Confirmed COVID-19 infection complicated by viral pneumonia and acute hypoxic respiratory failure  Diagnosed on:  09/11  Duration of illness prior to admission:  About 7 days prior to admission  - COVID therapeutics:   Decadron and remdesivir started 09/11  - Oxygenation/pulmonary: stable on 2L  - Labs: CRP trending down to 1.0 today  - At high risk of thrombotic complications due to COVID-19 disease.   Risk Category A, lovenox   - Discharge Anticoagulation: At discharge consider one of the following for 30 days and until the patient has returned to normal mobility:  Apixaban (Eliquis) 2.5 mg two times a day or Rivaroxaban (Xarelto) 10 mg once daily     #Thrombocytopenia likely due to cirrhosis  -monitor with DVT px, continue lovenox as long as plt > 50     #Diabetes - uncontrolled with steroids.  Last a1c 6.8  - blood sugars in 200-300 range with 416 evening of 9/12  - increase Lantus to 18 units daily, schedule Novolog 6 units with meals and cont sliding scale insulin  - cont to monitor sugars closely and adjust insulin as needed     #Essential hypertension   - Not on any medication.  Use hydralazine as needed.  Was on Norvasc in past, consider restarting.     #Cirrhosis, Chronic hepatitis C - treated according to patient. Monitor LFTs while on remdesivir; have been stable     History of meth use-check U tox - still pending     History of of moderate to heavy alcohol use-check alcohol level, start withdrawal protocol.  No signs of withdrawal.     Tobacco abuse-counseled quitting.  Gum as  needed     Chronic low back pain-no red flags, nonradicular.  2 view Lumbar spine xray pending.  Avoid narcotics, cont lidocaine patch     4 mm right lung nodule on CT-outpatient follow-up, discussed with patient     Diarrhea-unremarkable abdominal exam, LFTs.  Likely secondary to Covid.  Check C. difficile, stool culture if recurs - has not had further diarrhea to send sample; will discontinue       Diet: Consistent Carb 75 grams CHO per Meal Diet    DVT Prophylaxis: Enoxaparin (Lovenox) SQ  Christian Catheter: Not present  Central Lines: None  Code Status: Full Code      Disposition Plan   Expected discharge: 09/15/2021   recommended to prior living arrangement once hypoxia resolved, pt medically stable.     The patient's care was discussed with the Bedside Nurse, Care Coordinator/ and Patient.    Sienna Carter MD  Hospitalist Service  [facility]  Securely message with the Vocera Web Console (learn more here)  Text page via [employer] Paging/Directory      Clinically Significant Risk Factors Present on Admission     ______________________________________________________________________    Interval History   Pt doing okay today. She is anxious but denies any worsening cough or SOB. She is having pain in her lower back and is also concerned about that. She denies CP, nausea.     Data reviewed today: I reviewed all medications, new labs and imaging results over the last 24 hours. I personally reviewed no images or EKG's today.    Physical Exam   Vital Signs: Temp: 97.8  F (36.6  C) Temp src: Oral BP: 136/79 Pulse: 75   Resp: 20 SpO2: 92 % O2 Device: Nasal cannula Oxygen Delivery: 2 LPM  Weight: 174 lbs 2.61 oz  Constitutional: WDWN female lying in bed in NAD  HEENT: NC/AT, no scleral icterus, nl conjunctiva, moist oral mucosa  Respiratory: good inspiratory effort, lungs diminished on right, no wheezes/rhonchi/rales  Cardiovascular: RRR, no M/R/G, 2+ radial pulses  GI: soft, nontender,  nondistended  Skin: no rashes, warm, dry, intact  Neuro: CN 2-12 grossly intact, sensation grossly intact  MSK: able to move all 4 exts  Psych: nl mood/affect/judgment.      Data   Recent Labs   Lab 09/13/21  1126 09/13/21  0756 09/13/21  0731 09/12/21  1041 09/12/21  0407 09/12/21  0100 09/11/21 2048 09/11/21 2047   WBC  --   --  7.7 3.7*  --   --  2.7*  --    HGB  --   --  14.9 15.7  --   --  14.8  --    MCV  --   --  89 88  --   --  90  --    PLT  --   --  136* 110*  --   --  91*  --    INR  --   --   --   --   --  1.08  --  1.01   NA  --   --  135* 133*  --   --  134*  --    POTASSIUM  --   --  4.2 4.0  --   --  3.8  --    CHLORIDE  --   --  106 103  --   --  101  --    CO2  --   --  21* 21*  --   --  22  --    BUN  --   --  17 16  --   --  9  --    CR  --   --  0.58* 0.78  --   --  0.77  --    ANIONGAP  --   --  8 9  --   --  11  --    SCARLET  --   --  9.0 8.9  --   --  8.6  --    * 229* 248* 381*  --   --  116  --    ALBUMIN  --   --  3.1*  --  3.3*  --  3.2*  --    PROTTOTAL  --   --  6.4  --  7.0  --  6.7  --    BILITOTAL  --   --  0.4  --  0.4  --  0.4  --    ALKPHOS  --   --  87  --  86  --  82  --    ALT  --   --  16  --  22  --  20  --    AST  --   --  21  --  31  --  31  --      Recent Results (from the past 24 hour(s))   XR Lumbar Spine 2/3 Views    Narrative    EXAM: XR LUMBAR SPINE 2-3 VIEWS  LOCATION: [facility]  DATE/TIME: 9/13/2021 4:35 PM    INDICATION: Low back pain, COVID positive.    COMPARISON: Lumbar MRI 9/23/2015.    TECHNIQUE: CR Lumbar Spine.      Impression    IMPRESSION: There are five lumbar-type vertebral bodies in normal alignment. Normal vertebral body heights without compression fracture. Moderate disc space narrowing L4-L5 and L5-S1 with mild marginal osteophytes. Mild disc space narrowing at other   levels. Moderate facet arthropathy L4-L5 and L5-S1. The sacrum and visualized pelvis are unremarkable.     Medications     - MEDICATION INSTRUCTIONS -        - MEDICATION INSTRUCTIONS -         remdesivir  100 mg Intravenous Q24H    And     sodium chloride 0.9%  50 mL Intravenous Q24H     dexamethasone  6 mg Oral Daily     enoxaparin ANTICOAGULANT  40 mg Subcutaneous Q24H     folic acid  1 mg Oral Daily     gabapentin  200 mg Oral TID     insulin aspart  6 Units Subcutaneous TID w/meals     insulin aspart  1-7 Units Subcutaneous TID AC     insulin aspart  1-5 Units Subcutaneous At Bedtime     [START ON 9/14/2021] insulin glargine  18 Units Subcutaneous QAM AC     lidocaine  1 patch Transdermal Q24H     lidocaine   Transdermal Q8H     multivitamin w/minerals  1 tablet Oral Daily     sodium chloride (PF)  3 mL Intracatheter Q8H     thiamine  100 mg Oral Daily

## 2021-09-13 NOTE — PLAN OF CARE
Problem: Adult Inpatient Plan of Care  Goal: Plan of Care Review  Outcome: Improving  Goal: Optimal Comfort and Wellbeing  Outcome: Improving   Patient heard yelling out after using the call light and staff responding. She said the alarms kept going off and her family kept calling her. She decided to turn off her cell phone and store it away. She promised to only answer the hospital phone, Zyprexa 5 mg given, lights turned to dim per patients request. CIWA scores have been 2 and 3 all up to this burst of anxiety and her yelling at the top of her voice.     Pain to lower back is managed with Lidocaine Patch and scheduled gabapentin with relief. IS use encouraged and used frequently. Will continue to monitor.    Darryl Taylor RN

## 2021-09-13 NOTE — PROGRESS NOTES
"Diabetes Care Screen Note  Situation:  Diabetes blood glucose screen    Background:  Noted blood sugars remain elevated above hospital goal of < 180 for improved outcomes.  Home PTA diabetes meds:  None although I saw here 6/24 and Metformin was talked about for discharge  Current Inpatient diabetes meds:  10 units of Lantus in am  Novolog correction scale: 1/50 over 140  Also ordered: 6 mg Dexamethasone every day  Values;  A1C: 7.0           GFR:84        BMI:31.85       Intake documented %    Assessment: With steroid on board will need increase in Lantus and start of mealtime Novolog as well as an increase in resistance of correction scale for improved BG control      Recommendations:  See \"Guidelines for Insulin Initiation and Care in Hospitalized Adults\" link in the Diabetes Management order set for dosing guidelines    Assess BG pattern daily and adjust doses as needed.    Hospital BG goals < 180 for improved outcomes. There is impaired immune function at BG levels above 180 mg/dl.    Thanks.     Mona Gaffney RN, Certified Diabetes Care and   [address] [address]  [email or web address]   Office: [phone]  Pager: [phone]                                    "

## 2021-09-13 NOTE — PLAN OF CARE
Care Management Follow Up    Length of Stay (days): 1    Expected Discharge Date: 09/15/2021     Concerns to be Addressed:   Medical Progression    Patient plan of care discussed at interdisciplinary rounds: Yes    Anticipated Discharge Disposition:  Home      Additional Information:  Care management following medical Progression. Current plan is for the patient to discharge home once medically stable.      Emanuel Lau RN

## 2021-09-13 NOTE — SIGNIFICANT EVENT
Screaming heard from patient room, she was yelling at the top of her lungs.  Gowned up PPE donned, patient states that she is angry and has never felt this angry before.  She stated that family keeps calling and the alarms going off and she cannot stand it anymore.  Worked with patient on breathing techniques and reset the alarm settings on the pump.  Patient calmed down.  Primary nurse came to administer medications.   Sheri Spear RN  9/13/2021  6:31 PM

## 2021-09-14 LAB
ALBUMIN SERPL-MCNC: 2.9 G/DL (ref 3.5–5)
ALBUMIN UR-MCNC: NEGATIVE MG/DL
ALP SERPL-CCNC: 91 U/L (ref 45–120)
ALT SERPL W P-5'-P-CCNC: 14 U/L (ref 0–45)
ANION GAP SERPL CALCULATED.3IONS-SCNC: 9 MMOL/L (ref 5–18)
APPEARANCE UR: CLEAR
AST SERPL W P-5'-P-CCNC: 18 U/L (ref 0–40)
BILIRUB DIRECT SERPL-MCNC: 0.1 MG/DL
BILIRUB SERPL-MCNC: 0.4 MG/DL (ref 0–1)
BILIRUB UR QL STRIP: NEGATIVE
BUN SERPL-MCNC: 20 MG/DL (ref 8–22)
C REACTIVE PROTEIN LHE: 0.4 MG/DL (ref 0–0.8)
CALCIUM SERPL-MCNC: 8.9 MG/DL (ref 8.5–10.5)
CHLORIDE BLD-SCNC: 108 MMOL/L (ref 98–107)
CO2 SERPL-SCNC: 21 MMOL/L (ref 22–31)
COLOR UR AUTO: ABNORMAL
CREAT SERPL-MCNC: 0.75 MG/DL (ref 0.6–1.1)
D DIMER PPP FEU-MCNC: 0.5 UG/ML FEU (ref 0–0.5)
ERYTHROCYTE [DISTWIDTH] IN BLOOD BY AUTOMATED COUNT: 11.5 % (ref 10–15)
FIBRINOGEN PPP-MCNC: 469 MG/DL (ref 170–490)
GFR SERPL CREATININE-BSD FRML MDRD: 88 ML/MIN/1.73M2
GLUCOSE BLD-MCNC: 274 MG/DL (ref 70–125)
GLUCOSE BLDC GLUCOMTR-MCNC: 239 MG/DL (ref 70–99)
GLUCOSE BLDC GLUCOMTR-MCNC: 312 MG/DL (ref 70–99)
GLUCOSE BLDC GLUCOMTR-MCNC: 362 MG/DL (ref 70–99)
GLUCOSE BLDC GLUCOMTR-MCNC: 397 MG/DL (ref 70–99)
GLUCOSE UR STRIP-MCNC: >1000 MG/DL
HCT VFR BLD AUTO: 42.8 % (ref 35–47)
HGB BLD-MCNC: 14.6 G/DL (ref 11.7–15.7)
HGB UR QL STRIP: NEGATIVE
KETONES UR STRIP-MCNC: NEGATIVE MG/DL
LEUKOCYTE ESTERASE UR QL STRIP: NEGATIVE
MCH RBC QN AUTO: 30.7 PG (ref 26.5–33)
MCHC RBC AUTO-ENTMCNC: 34.1 G/DL (ref 31.5–36.5)
MCV RBC AUTO: 90 FL (ref 78–100)
NITRATE UR QL: NEGATIVE
PH UR STRIP: 6.5 [PH] (ref 5–7)
PLATELET # BLD AUTO: 146 10E3/UL (ref 150–450)
POTASSIUM BLD-SCNC: 4.1 MMOL/L (ref 3.5–5)
PROT SERPL-MCNC: 6.3 G/DL (ref 6–8)
RBC # BLD AUTO: 4.75 10E6/UL (ref 3.8–5.2)
SODIUM SERPL-SCNC: 138 MMOL/L (ref 136–145)
SP GR UR STRIP: 1.02 (ref 1–1.03)
UROBILINOGEN UR STRIP-MCNC: <2 MG/DL
WBC # BLD AUTO: 7.7 10E3/UL (ref 4–11)

## 2021-09-14 PROCEDURE — 250N000013 HC RX MED GY IP 250 OP 250 PS 637: Performed by: STUDENT IN AN ORGANIZED HEALTH CARE EDUCATION/TRAINING PROGRAM

## 2021-09-14 PROCEDURE — 99232 SBSQ HOSP IP/OBS MODERATE 35: CPT | Performed by: HOSPITALIST

## 2021-09-14 PROCEDURE — 86141 C-REACTIVE PROTEIN HS: CPT | Performed by: FAMILY MEDICINE

## 2021-09-14 PROCEDURE — 80053 COMPREHEN METABOLIC PANEL: CPT | Performed by: FAMILY MEDICINE

## 2021-09-14 PROCEDURE — 250N000013 HC RX MED GY IP 250 OP 250 PS 637: Performed by: FAMILY MEDICINE

## 2021-09-14 PROCEDURE — 85379 FIBRIN DEGRADATION QUANT: CPT | Performed by: FAMILY MEDICINE

## 2021-09-14 PROCEDURE — 36415 COLL VENOUS BLD VENIPUNCTURE: CPT | Performed by: FAMILY MEDICINE

## 2021-09-14 PROCEDURE — 258N000003 HC RX IP 258 OP 636: Performed by: FAMILY MEDICINE

## 2021-09-14 PROCEDURE — 82248 BILIRUBIN DIRECT: CPT | Performed by: HOSPITALIST

## 2021-09-14 PROCEDURE — 250N000011 HC RX IP 250 OP 636: Performed by: FAMILY MEDICINE

## 2021-09-14 PROCEDURE — 85027 COMPLETE CBC AUTOMATED: CPT | Performed by: FAMILY MEDICINE

## 2021-09-14 PROCEDURE — 250N000012 HC RX MED GY IP 250 OP 636 PS 637: Performed by: FAMILY MEDICINE

## 2021-09-14 PROCEDURE — 85384 FIBRINOGEN ACTIVITY: CPT | Performed by: FAMILY MEDICINE

## 2021-09-14 PROCEDURE — 120N000001 HC R&B MED SURG/OB

## 2021-09-14 PROCEDURE — 250N000009 HC RX 250: Performed by: FAMILY MEDICINE

## 2021-09-14 PROCEDURE — 81003 URINALYSIS AUTO W/O SCOPE: CPT | Performed by: FAMILY MEDICINE

## 2021-09-14 PROCEDURE — 250N000012 HC RX MED GY IP 250 OP 636 PS 637: Performed by: HOSPITALIST

## 2021-09-14 PROCEDURE — 82310 ASSAY OF CALCIUM: CPT | Performed by: FAMILY MEDICINE

## 2021-09-14 RX ORDER — HYDROXYZINE HYDROCHLORIDE 25 MG/1
25 TABLET, FILM COATED ORAL EVERY 6 HOURS PRN
Status: DISCONTINUED | OUTPATIENT
Start: 2021-09-14 | End: 2021-09-15 | Stop reason: HOSPADM

## 2021-09-14 RX ADMIN — ACETAMINOPHEN 650 MG: 325 TABLET ORAL at 08:35

## 2021-09-14 RX ADMIN — ENOXAPARIN SODIUM 40 MG: 40 INJECTION SUBCUTANEOUS at 08:36

## 2021-09-14 RX ADMIN — INSULIN ASPART 6 UNITS: 100 INJECTION, SOLUTION INTRAVENOUS; SUBCUTANEOUS at 08:39

## 2021-09-14 RX ADMIN — INSULIN ASPART 4 UNITS: 100 INJECTION, SOLUTION INTRAVENOUS; SUBCUTANEOUS at 18:25

## 2021-09-14 RX ADMIN — HYDROXYZINE HYDROCHLORIDE 25 MG: 25 TABLET, FILM COATED ORAL at 23:09

## 2021-09-14 RX ADMIN — REMDESIVIR 100 MG: 100 INJECTION, POWDER, LYOPHILIZED, FOR SOLUTION INTRAVENOUS at 18:27

## 2021-09-14 RX ADMIN — INSULIN ASPART 4 UNITS: 100 INJECTION, SOLUTION INTRAVENOUS; SUBCUTANEOUS at 21:14

## 2021-09-14 RX ADMIN — INSULIN ASPART 6 UNITS: 100 INJECTION, SOLUTION INTRAVENOUS; SUBCUTANEOUS at 13:51

## 2021-09-14 RX ADMIN — INSULIN ASPART 6 UNITS: 100 INJECTION, SOLUTION INTRAVENOUS; SUBCUTANEOUS at 18:31

## 2021-09-14 RX ADMIN — GABAPENTIN 200 MG: 100 CAPSULE ORAL at 21:13

## 2021-09-14 RX ADMIN — SODIUM CHLORIDE 50 ML: 9 INJECTION, SOLUTION INTRAVENOUS at 19:26

## 2021-09-14 RX ADMIN — ENOXAPARIN SODIUM 40 MG: 40 INJECTION SUBCUTANEOUS at 08:39

## 2021-09-14 RX ADMIN — FOLIC ACID 1 MG: 1 TABLET ORAL at 08:36

## 2021-09-14 RX ADMIN — MULTIPLE VITAMINS W/ MINERALS TAB 1 TABLET: TAB at 08:35

## 2021-09-14 RX ADMIN — INSULIN ASPART 5 UNITS: 100 INJECTION, SOLUTION INTRAVENOUS; SUBCUTANEOUS at 11:19

## 2021-09-14 RX ADMIN — GABAPENTIN 200 MG: 100 CAPSULE ORAL at 13:51

## 2021-09-14 RX ADMIN — DEXAMETHASONE 6 MG: 4 TABLET ORAL at 08:36

## 2021-09-14 RX ADMIN — GABAPENTIN 200 MG: 100 CAPSULE ORAL at 08:37

## 2021-09-14 RX ADMIN — INSULIN GLARGINE 18 UNITS: 100 INJECTION, SOLUTION SUBCUTANEOUS at 08:38

## 2021-09-14 RX ADMIN — Medication 100 MG: at 08:36

## 2021-09-14 RX ADMIN — INSULIN ASPART 2 UNITS: 100 INJECTION, SOLUTION INTRAVENOUS; SUBCUTANEOUS at 08:39

## 2021-09-14 RX ADMIN — MELATONIN TAB 3 MG 5 MG: 3 TAB at 21:13

## 2021-09-14 RX ADMIN — LIDOCAINE 1 PATCH: 246 PATCH TOPICAL at 04:45

## 2021-09-14 ASSESSMENT — MIFFLIN-ST. JEOR: SCORE: 1797.08

## 2021-09-14 NOTE — PLAN OF CARE
Problem: Adult Inpatient Plan of Care  Goal: Plan of Care Review  Outcome: No Change  Flowsheets (Taken 9/14/2021 1425)  Plan of Care Reviewed With: patient     Problem: Gas Exchange Impaired  Goal: Optimal Gas Exchange  Outcome: Improving   Patient o2 saturation 94% on room air.  Maintaining o2 saturation with activity.    Problem: Adult Inpatient Plan of Care  Goal: Absence of Hospital-Acquired Illness or Injury  Intervention: Identify and Manage Fall Risk  Recent Flowsheet Documentation  Taken 9/14/2021 1135 by Chante Jack, RN  Safety Promotion/Fall Prevention: nonskid shoes/slippers when out of bed  Taken 9/14/2021 0835 by Chante Jack, RN  Safety Promotion/Fall Prevention: nonskid shoes/slippers when out of bed   Patient reporting mild fatigue.  Sleeping off and on throughout the shift.

## 2021-09-14 NOTE — PLAN OF CARE
Problem: Adult Inpatient Plan of Care  Goal: Plan of Care Review  Outcome: Improving     Problem: Adult Inpatient Plan of Care  Goal: Absence of Hospital-Acquired Illness or Injury  Intervention: Prevent Skin Injury  Recent Flowsheet Documentation  Taken 9/14/2021 0100 by Jackeline De RN  Body Position: position changed independently     Problem: Gas Exchange Impaired  Goal: Optimal Gas Exchange  Outcome: Improving  Intervention: Optimize Oxygenation and Ventilation  Recent Flowsheet Documentation  Taken 9/14/2021 0100 by Jackeline De RN  Head of Bed (HOB) Positioning: HOB at 30 degrees    Resting quietly on bed, calm and cooperative with cares. Verbalized generalized pain. With due lidocaine patch. Vitals are stable and within normal limits. Denies shortness of breath or chest pains.   Alert and oriented and uses the call light appropriately.   Purewick in place, draining well.   CIWA is zero this shift.

## 2021-09-14 NOTE — PROGRESS NOTES
[facility]    Medicine Progress Note - Hospitalist Service       Date of Admission:  9/11/2021    Assessment & Plan           60 year old female admitted on 9/11/2021 for EVAR, shortness of breath secondary to COVID-19 pneumonia.  She has a past history of treated hepatitis C, diet-controlled diabetes, hypertension, methamphetamine use, alcohol use, chronic back pain.     Assessment/Plan     #Confirmed COVID-19 infection complicated by viral pneumonia and acute hypoxic respiratory failure  Diagnosed on:  09/11  Duration of illness prior to admission:  About 7 days prior to admission  - COVID therapeutics:   Decadron and remdesivir started 09/11  - Oxygenation/pulmonary: stable on RA  - Labs: CRP trended down to 1.0  - At high risk of thrombotic complications due to COVID-19 disease.   Risk Category A, lovenox   - Discharge Anticoagulation: At discharge consider one of the following for 30 days and until the patient has returned to normal mobility:  Apixaban (Eliquis) 2.5 mg two times a day or Rivaroxaban (Xarelto) 10 mg once daily     #Thrombocytopenia likely due to cirrhosis  -monitor with DVT px, continue lovenox as long as plt > 50  - plt 146 today     #Diabetes - uncontrolled with steroids.  Last a1c 6.8  - blood sugars in 200-300 range with 416 evening of 9/12  - increased Lantus to 18 units daily, scheduled Novolog 6 units with meals and cont sliding scale insulin  - cont to monitor sugars closely and adjust insulin as needed     #Essential hypertension   - Not on any medication.  Use hydralazine as needed.  Was on Norvasc in past, can consider restarting.     #Cirrhosis, Chronic hepatitis C - treated according to patient. Monitor LFTs while on remdesivir; have been stable     History of meth use-check U tox - positive for amphetamines     History of of moderate to heavy alcohol use-check alcohol level, start withdrawal protocol.  No signs of withdrawal.     Tobacco abuse-counseled  quitting.  Gum as needed     Chronic low back pain-no red flags, nonradicular.  2 view Lumbar spine xray with OA, no evidence of compression fxs.  Avoid narcotics, cont lidocaine patch     4 mm right lung nodule on CT-outpatient follow-up, discussed with patient     Diarrhea  -unremarkable abdominal exam, LFTs.  Likely secondary to Covid.    - discontinued C. Diff testing since no further episodes       Diet: Consistent Carb 75 grams CHO per Meal Diet    DVT Prophylaxis: Enoxaparin (Lovenox) SQ  Christian Catheter: Not present  Central Lines: None  Code Status: Full Code      Disposition Plan   Expected discharge: 09/15/2021   recommended to prior living arrangement once hypoxia resolved, pt medically stable.     The patient's care was discussed with the Bedside Nurse, Care Coordinator/ and Patient.    Sienna Carter MD  Hospitalist Service  [facility]  Securely message with the Vocera Web Console (learn more here)  Text page via [employer] Paging/Directory      Clinically Significant Risk Factors Present on Admission     ______________________________________________________________________    Interval History   Pt didn't sleep well last night, but overall she thinks she is doing better. Still with low back pain, not worsened. Was able to be weaned off O2 yesterday and has maintained her O2 sats.    Data reviewed today: I reviewed all medications, new labs and imaging results over the last 24 hours. I personally reviewed no images or EKG's today.    Physical Exam   Vital Signs: Temp: 98.3  F (36.8  C) Temp src: Oral BP: 130/73 Pulse: 69   Resp: 19 SpO2: 95 % O2 Device: None (Room air) Oxygen Delivery: 3 LPM  Weight: 272 lbs 8 oz  Constitutional: WDWN female lying in bed in NAD  HEENT: NC/AT, no scleral icterus, nl conjunctiva, moist oral mucosa  Respiratory: good inspiratory effort, lungs diminished at bases, no wheezes/rhonchi/rales  Cardiovascular: RRR, no M/R/G, 2+ radial  pulses  GI: soft, nontender, nondistended  Skin: no rashes, warm, dry, intact  Neuro: CN 2-12 grossly intact, sensation grossly intact  MSK: able to move all 4 exts  Psych: nl mood/affect/judgment.      Data   Recent Labs   Lab 09/14/21  1117 09/14/21  0833 09/14/21  0624 09/13/21  0731 09/12/21  1041 09/12/21  0215 09/12/21  0100 09/11/21 2048 09/11/21 2047   WBC  --   --  7.7 7.7 3.7*  --   --   --   --    HGB  --   --  14.6 14.9 15.7  --   --   --   --    MCV  --   --  90 89 88  --   --   --   --    PLT  --   --  146* 136* 110*  --   --   --   --    INR  --   --   --   --   --   --  1.08  --  1.01   NA  --   --  138 135* 133*  --   --   --   --    POTASSIUM  --   --  4.1 4.2 4.0  --   --   --   --    CHLORIDE  --   --  108* 106 103  --   --   --   --    CO2  --   --  21* 21* 21*  --   --   --   --    BUN  --   --  20 17 16  --   --   --   --    CR  --   --  0.75 0.58* 0.78  --   --   --   --    ANIONGAP  --   --  9 8 9  --   --   --   --    SCARLET  --   --  8.9 9.0 8.9  --   --   --   --    * 239* 274* 248* 381*  --   --    < >  --    ALBUMIN  --   --  2.9* 3.1*  --    < >  --   --   --    PROTTOTAL  --   --  6.3 6.4  --    < >  --   --   --    BILITOTAL  --   --  0.4 0.4  --    < >  --   --   --    ALKPHOS  --   --  91 87  --    < >  --   --   --    ALT  --   --  14 16  --    < >  --   --   --    AST  --   --  18 21  --    < >  --   --   --     < > = values in this interval not displayed.     No results found for this or any previous visit (from the past 24 hour(s)).  Medications     - MEDICATION INSTRUCTIONS -       - MEDICATION INSTRUCTIONS -         remdesivir  100 mg Intravenous Q24H    And     sodium chloride 0.9%  50 mL Intravenous Q24H     dexamethasone  6 mg Oral Daily     enoxaparin ANTICOAGULANT  40 mg Subcutaneous Q24H     folic acid  1 mg Oral Daily     gabapentin  200 mg Oral TID     insulin aspart  6 Units Subcutaneous TID w/meals     insulin aspart  1-7 Units Subcutaneous TID AC     insulin  aspart  1-5 Units Subcutaneous At Bedtime     insulin glargine  18 Units Subcutaneous QAM AC     lidocaine  1 patch Transdermal Q24H     lidocaine   Transdermal Q8H     multivitamin w/minerals  1 tablet Oral Daily     sodium chloride (PF)  3 mL Intracatheter Q8H     thiamine  100 mg Oral Daily

## 2021-09-15 VITALS
HEIGHT: 64 IN | RESPIRATION RATE: 18 BRPM | DIASTOLIC BLOOD PRESSURE: 76 MMHG | HEART RATE: 64 BPM | BODY MASS INDEX: 46.52 KG/M2 | OXYGEN SATURATION: 93 % | SYSTOLIC BLOOD PRESSURE: 149 MMHG | WEIGHT: 272.5 LBS | TEMPERATURE: 98 F

## 2021-09-15 LAB
ALBUMIN SERPL-MCNC: 3 G/DL (ref 3.5–5)
ALP SERPL-CCNC: 83 U/L (ref 45–120)
ALT SERPL W P-5'-P-CCNC: 18 U/L (ref 0–45)
ANION GAP SERPL CALCULATED.3IONS-SCNC: 6 MMOL/L (ref 5–18)
AST SERPL W P-5'-P-CCNC: 19 U/L (ref 0–40)
BILIRUB DIRECT SERPL-MCNC: 0.2 MG/DL
BILIRUB SERPL-MCNC: 0.4 MG/DL (ref 0–1)
BUN SERPL-MCNC: 18 MG/DL (ref 8–22)
C REACTIVE PROTEIN LHE: 0.2 MG/DL (ref 0–0.8)
CALCIUM SERPL-MCNC: 8.7 MG/DL (ref 8.5–10.5)
CHLORIDE BLD-SCNC: 106 MMOL/L (ref 98–107)
CO2 SERPL-SCNC: 24 MMOL/L (ref 22–31)
CREAT SERPL-MCNC: 0.72 MG/DL (ref 0.6–1.1)
D DIMER PPP FEU-MCNC: 0.47 UG/ML FEU (ref 0–0.5)
ERYTHROCYTE [DISTWIDTH] IN BLOOD BY AUTOMATED COUNT: 11.3 % (ref 10–15)
FIBRINOGEN PPP-MCNC: 396 MG/DL (ref 170–490)
GFR SERPL CREATININE-BSD FRML MDRD: >90 ML/MIN/1.73M2
GLUCOSE BLD-MCNC: 249 MG/DL (ref 70–125)
GLUCOSE BLDC GLUCOMTR-MCNC: 218 MG/DL (ref 70–99)
GLUCOSE BLDC GLUCOMTR-MCNC: 344 MG/DL (ref 70–99)
HCT VFR BLD AUTO: 40.6 % (ref 35–47)
HGB BLD-MCNC: 14.1 G/DL (ref 11.7–15.7)
MCH RBC QN AUTO: 30.9 PG (ref 26.5–33)
MCHC RBC AUTO-ENTMCNC: 34.7 G/DL (ref 31.5–36.5)
MCV RBC AUTO: 89 FL (ref 78–100)
PLATELET # BLD AUTO: 142 10E3/UL (ref 150–450)
POTASSIUM BLD-SCNC: 4 MMOL/L (ref 3.5–5)
PROT SERPL-MCNC: 5.9 G/DL (ref 6–8)
RBC # BLD AUTO: 4.57 10E6/UL (ref 3.8–5.2)
SODIUM SERPL-SCNC: 136 MMOL/L (ref 136–145)
WBC # BLD AUTO: 6.9 10E3/UL (ref 4–11)

## 2021-09-15 PROCEDURE — 85027 COMPLETE CBC AUTOMATED: CPT | Performed by: FAMILY MEDICINE

## 2021-09-15 PROCEDURE — 250N000012 HC RX MED GY IP 250 OP 636 PS 637: Performed by: FAMILY MEDICINE

## 2021-09-15 PROCEDURE — 99239 HOSP IP/OBS DSCHRG MGMT >30: CPT | Performed by: HOSPITALIST

## 2021-09-15 PROCEDURE — 36415 COLL VENOUS BLD VENIPUNCTURE: CPT | Performed by: FAMILY MEDICINE

## 2021-09-15 PROCEDURE — 250N000013 HC RX MED GY IP 250 OP 250 PS 637: Performed by: FAMILY MEDICINE

## 2021-09-15 PROCEDURE — 82310 ASSAY OF CALCIUM: CPT | Performed by: FAMILY MEDICINE

## 2021-09-15 PROCEDURE — 86141 C-REACTIVE PROTEIN HS: CPT | Performed by: FAMILY MEDICINE

## 2021-09-15 PROCEDURE — 82248 BILIRUBIN DIRECT: CPT | Performed by: HOSPITALIST

## 2021-09-15 PROCEDURE — 85384 FIBRINOGEN ACTIVITY: CPT | Performed by: FAMILY MEDICINE

## 2021-09-15 PROCEDURE — 85379 FIBRIN DEGRADATION QUANT: CPT | Performed by: FAMILY MEDICINE

## 2021-09-15 PROCEDURE — 250N000011 HC RX IP 250 OP 636: Performed by: FAMILY MEDICINE

## 2021-09-15 PROCEDURE — 250N000012 HC RX MED GY IP 250 OP 636 PS 637: Performed by: HOSPITALIST

## 2021-09-15 RX ORDER — GABAPENTIN 100 MG/1
200 CAPSULE ORAL 3 TIMES DAILY
Qty: 21 CAPSULE | Refills: 0 | Status: SHIPPED | OUTPATIENT
Start: 2021-09-15 | End: 2022-11-05

## 2021-09-15 RX ORDER — AMLODIPINE BESYLATE 5 MG/1
5 TABLET ORAL DAILY
Qty: 30 TABLET | Refills: 0 | Status: SHIPPED | OUTPATIENT
Start: 2021-09-15 | End: 2024-08-28

## 2021-09-15 RX ADMIN — INSULIN ASPART 6 UNITS: 100 INJECTION, SOLUTION INTRAVENOUS; SUBCUTANEOUS at 09:23

## 2021-09-15 RX ADMIN — LIDOCAINE 1 PATCH: 246 PATCH TOPICAL at 04:19

## 2021-09-15 RX ADMIN — LORAZEPAM 1 MG: 1 TABLET ORAL at 14:13

## 2021-09-15 RX ADMIN — INSULIN ASPART 6 UNITS: 100 INJECTION, SOLUTION INTRAVENOUS; SUBCUTANEOUS at 13:46

## 2021-09-15 RX ADMIN — FOLIC ACID 1 MG: 1 TABLET ORAL at 09:22

## 2021-09-15 RX ADMIN — GABAPENTIN 200 MG: 100 CAPSULE ORAL at 09:22

## 2021-09-15 RX ADMIN — GABAPENTIN 200 MG: 100 CAPSULE ORAL at 13:44

## 2021-09-15 RX ADMIN — Medication 100 MG: at 09:21

## 2021-09-15 RX ADMIN — INSULIN GLARGINE 18 UNITS: 100 INJECTION, SOLUTION SUBCUTANEOUS at 09:21

## 2021-09-15 RX ADMIN — ENOXAPARIN SODIUM 40 MG: 40 INJECTION SUBCUTANEOUS at 09:21

## 2021-09-15 RX ADMIN — LORAZEPAM 1 MG: 1 TABLET ORAL at 04:45

## 2021-09-15 RX ADMIN — INSULIN ASPART 2 UNITS: 100 INJECTION, SOLUTION INTRAVENOUS; SUBCUTANEOUS at 09:20

## 2021-09-15 RX ADMIN — MULTIPLE VITAMINS W/ MINERALS TAB 1 TABLET: TAB at 09:22

## 2021-09-15 RX ADMIN — DEXAMETHASONE 6 MG: 4 TABLET ORAL at 09:21

## 2021-09-15 RX ADMIN — INSULIN ASPART 5 UNITS: 100 INJECTION, SOLUTION INTRAVENOUS; SUBCUTANEOUS at 13:45

## 2021-09-15 RX ADMIN — LORAZEPAM 1 MG: 1 TABLET ORAL at 04:15

## 2021-09-15 NOTE — PROGRESS NOTES
Care Management Discharge Note    Discharge Date: 09/15/2021       Discharge Disposition: Home    Discharge Services:      Discharge DME:      Discharge Transportation: family or friend will provide    Private pay costs discussed: Not applicable    PAS Confirmation Code:  N/A    Patient/family educated on Medicare website which has current facility and service quality ratings:  N/A    Education Provided on the Discharge Plan:  N/A    Persons Notified of Discharge Plans: Vitaly    Patient/Family in Agreement with the Plan: yes    Handoff Referral Completed: no    Additional Information: SW informed by pt's nurse Kelsey that pt discharging and needs a ride set up for her, as family unable to pick her up due to also being Covid positive.  SW reviewed chart and noted that pt has Heidi CAMARILLO.  SW made many calls to [employer] and waited on the phone for a long time waiting for someone to .  JENY consulted with CM Supervisor Johana FOSTER and discussed possible cab voucher for pt through [employer].  SW spoke with staff at [employer] and almost had ride set up for pt when pt's nurse informed SW that pt's relatives just showed up to hospital to  pt and so cab ride no longer needed.  SW cancelled with [employer].  Nurse expressed concern about pt feeling overwhelmed due to stressful home situation (kids in CD treatment, taking care of grandkids) and requested that there be some follow-up with pt once she discharges.  SW entered Care Coordination Referral in pt's chart requesting support services for pt.  Pt's nurse came back a short time later and reported that pt's grandkids were downstairs but it appeared they had been dropped off by someone and so there was no one to provide a ride to pt.  JENY referred pt's nurse to [employer] to set up a ride home for pt.        BASILIA Ruggiero, RADHA 09/15/21 4:54 PM

## 2021-09-15 NOTE — PLAN OF CARE
"./73 (BP Location: Right arm)   Pulse 69   Temp 98.3  F (36.8  C) (Oral)   Resp 19   Ht 1.619 m (5' 3.75\")   Wt 123.6 kg (272 lb 8 oz)   SpO2 95%   BMI 47.14 kg/m     Patient denied pain, oxygen stays above 92% with activity. Patient ready to go home although she is anxious about what to do at home for Covid. Since she states that her granddaughter is at home with Covid going on 4 days since diagnosed. Patient requested to bundle cares because she states that no sleep is making her crabby. Yara Jackson, RN      "

## 2021-09-15 NOTE — PLAN OF CARE
Problem: Adult Inpatient Plan of Care  Goal: Plan of Care Review  Outcome: Improving    Patient resting quietly at the beginning of the shift with easy and even breathing. Heart rate wnl which later dropped to below 50(47-49). Asymptomatic. Notified HO and said it is ok to change the pulse oximeter setting for HR down to 45.   @approximately 0415, patient became restless and agitated and voiced that she feels terrible and she hasn't slept at all. Administered 1mg of po ativan. Soon, patient started pulling her oximeter probe off, became more agitated, angry and upset. Additional 1mg of ativan given. RN paged and notified the hospitalist.   Upon reassessment, patient is finally calm and sleeping.

## 2021-09-15 NOTE — PROGRESS NOTES
Care Management Discharge Note    Discharge Date: 09/15/2021       Discharge Disposition:      Discharge Services:      Discharge DME:      Discharge Transportation: (P) family or friend will provide    Private pay costs discussed: Not applicable    PAS Confirmation Code:    Patient/family educated on Medicare website which has current facility and service quality ratings:      Education Provided on the Discharge Plan:    Persons Notified of Discharge Plans: patient   Patient/Family in Agreement with the Plan:      Handoff Referral Completed: Yes    Additional Information:  Home with family         Johana Auguste RN

## 2021-09-15 NOTE — PROVIDER NOTIFICATION
"MD notified of patient refusing pulse ox, as she stated \"it is causing her agitation\". She started yelling, scored for CIWA. Patient refused vitals to match the CIWA score.  "

## 2021-09-15 NOTE — DISCHARGE SUMMARY
[facility]  Hospitalist Discharge Summary      Date of Admission:  9/11/2021  Date of Discharge:  9/15/2021  Discharging Provider: Sienna Carter MD      Discharge Diagnoses   COVID pneumonia with acute hypoxic respiratory failure  Chronic hepatitis C with cirrhosis  Thrombocytopenia due to cirrhosis  DM2  HTN  H/o methamphetamine use  Chronic low back pain  4mm right lung nodule on CT      Follow-ups Needed After Discharge   Follow-up Appointments     Follow-up and recommended labs and tests       Follow up with primary care provider, Physician No Ref-Primary, within 7   days to evaluate medication change and for hospital follow- up.  The   following labs/tests are recommended: none.         Will need outpatient follow up for 4mm right lung nodule seen on CT    Unresulted Labs Ordered in the Past 30 Days of this Admission     Date and Time Order Name Status Description    9/11/2021  7:08 PM Blood Culture Peripheral Blood Preliminary     9/11/2021  7:08 PM Blood Culture Arm, Left Preliminary       These results will be followed up by PCP    Discharge Disposition   Discharged to home  Condition at discharge: Stable      Hospital Course              60 year old female admitted on 9/11/2021 for shortness of breath secondary to COVID-19 pneumonia.  She has a past history of treated hepatitis C, diet-controlled diabetes, hypertension, methamphetamine use, alcohol use, chronic back pain. She was found to be hypoxic and started on dexamethasone and remdesivir and completed 5 days of these. She was able to be weaned to room air and maintained good O2 saturations. Pt has been nonadherent with previous medications prescribed in the past for her DM2 and HTN, but she is being provided with Rxs for metformin 500mg daily and norvasc 5mg daily based on blood sugar and blood pressure readings while she was admitted. She will not be discharged on long term anticoagulation due to adherence, increased  mobility, and cost. Her blood sugars were more elevated in the hospital due to steroid use and should improve with discontinuation of steroids and addition of metformin. She had a lumbar spine xray for low back pain which showed arthritis but no compression fractures. She did have a 4mm right lung nodule on chest CT that will need to be followed as an outpatient and this was discussed with the patient during her hospitalization. She has been encouraged to establish with a PCP as soon as possible and has been provided resources.     Assessment/Plan     #Confirmed COVID-19 infection complicated by viral pneumonia and acute hypoxic respiratory failure  Diagnosed on:  09/11  Duration of illness prior to admission:  About 7 days prior to admission  - COVID therapeutics:   Decadron and remdesivir started 09/11  - Oxygenation/pulmonary: stable on RA  - Labs: CRP trended down to 1.0  - At high risk of thrombotic complications due to COVID-19 disease.   Risk Category A, lovenox   - Discharge Anticoagulation: At discharge consider one of the following for 30 days and until the patient has returned to normal mobility:  Apixaban (Eliquis) 2.5 mg two times a day or Rivaroxaban (Xarelto) 10 mg once daily     #Thrombocytopenia likely due to cirrhosis  -monitor with DVT px, continue lovenox as long as plt > 50  - plt 146 today     #Diabetes - uncontrolled with steroids.  Last a1c 6.8  - blood sugars in 200-300 range with 416 evening of 9/12  - increased Lantus to 18 units daily, scheduled Novolog 6 units with meals and cont sliding scale insulin  - cont to monitor sugars closely and adjust insulin as needed     #Essential hypertension   - Not on any medication.  Use hydralazine as needed.  Was on Norvasc in past, can consider restarting.     #Cirrhosis, Chronic hepatitis C - treated according to patient. Monitor LFTs while on remdesivir; have been stable     History of meth use-check U tox - positive for amphetamines     History of  of moderate to heavy alcohol use-check alcohol level, start withdrawal protocol.  No signs of withdrawal.     Tobacco abuse-counseled quitting.  Gum as needed     Chronic low back pain-no red flags, nonradicular.  2 view Lumbar spine xray with OA, no evidence of compression fxs.  Avoid narcotics, cont lidocaine patch     4 mm right lung nodule on CT-outpatient follow-up, discussed with patient     Diarrhea  -unremarkable abdominal exam, LFTs.  Likely secondary to Covid.    - discontinued C. Diff testing since no further episodes      Consultations This Hospital Stay   None    Code Status   Full Code    Time Spent on this Encounter   I, Sienna Carter MD, personally saw the patient today and spent greater than 30 minutes discharging this patient.       Sienna Carter MD  [address] [address]  Phone: [phone]  Fax: [phone]  ______________________________________________________________________    Physical Exam   Vital Signs: Temp: 98  F (36.7  C) Temp src: Oral BP: (!) 149/76 (RN notified) Pulse: 64   Resp: 18 SpO2: 93 % O2 Device: None (Room air) Oxygen Delivery: 3 LPM  Weight: 272 lbs 8 oz  Constitutional: WDWN female lying in bed in [facility]  HEENT: NC/AT, no scleral icterus, nl conjunctiva, moist oral mucosa  Respiratory:     good inspiratory effort, lungs with faint crackles at the bases, good air movement  Cardiovascular: RRR, no M/R/G, 2+ radial pulses  GI: soft, nontender, nondistended  Skin: no rashes, warm, dry  Neuro: CN 2-12 grossly intact  MSK: able to move all 4 exts  Psych: nl mood/affect/judgment.       Primary Care Physician   Physician No Ref-Primary    Discharge Orders      Reason for your hospital stay    COVID pneumonia     Follow-up and recommended labs and tests     Follow up with primary care provider, Physician No Ref-Primary, within 7 days to evaluate medication change and for hospital follow- up.  The following  labs/tests are recommended: none.     Activity    Your activity upon discharge: activity as tolerated     When to contact your care team    Call your primary doctor if you have any of the following: temperature greater than 100.6,  increased shortness of breath, increased pain not relieved with medication, or worsening fatigue not relieved with rest.     Discharge Instructions    Please check your blood sugars at least twice daily while at home and keep a record to take with you to your primary care provider.    Please establish with a primary care provider to help manage your diabetes and high blood pressure long term.     Diet    Follow this diet upon discharge: Orders Placed This Encounter      Consistent Carb 75 grams CHO per Meal Diet       Significant Results and Procedures   Most Recent 3 CBC's:Recent Labs   Lab Test 09/15/21  0652 09/14/21  0624 09/13/21  0731   WBC 6.9 7.7 7.7   HGB 14.1 14.6 14.9   MCV 89 90 89   * 146* 136*     Most Recent 3 BMP's:Recent Labs   Lab Test 09/15/21  0801 09/15/21  0652 09/14/21  2103 09/14/21  0833 09/14/21 0624 09/13/21  0756 09/13/21  0731   NA  --  136  --   --  138  --  135*   POTASSIUM  --  4.0  --   --  4.1  --  4.2   CHLORIDE  --  106  --   --  108*  --  106   CO2  --  24  --   --  21*  --  21*   BUN  --  18  --   --  20  --  17   CR  --  0.72  --   --  0.75  --  0.58*   ANIONGAP  --  6  --   --  9  --  8   SCARLET  --  8.7  --   --  8.9  --  9.0   * 249* 397*   < > 274*   < > 248*    < > = values in this interval not displayed.     Most Recent 2 LFT's:Recent Labs   Lab Test 09/15/21  0652 09/14/21  0624   AST 19 18   ALT 18 14   ALKPHOS 83 91   BILITOTAL 0.4 0.4   ,   Results for orders placed or performed during the hospital encounter of 09/11/21   CT Chest Pulmonary Embolism w Contrast    Narrative    EXAM: CT CHEST PULMONARY EMBOLISM W CONTRAST  LOCATION: [facility]  DATE/TIME: 9/11/2021 9:36 PM    INDICATION: Cough, shortness  of breath.  COMPARISON: 6/23/2021.  TECHNIQUE: CT chest pulmonary angiogram during arterial phase injection of IV contrast. Multiplanar reformats and MIP reconstructions were performed. Dose reduction techniques were used.   CONTRAST: Isovue 370 100 ml.    FINDINGS:  ANGIOGRAM CHEST: Pulmonary arteries are normal caliber and negative for pulmonary emboli. Thoracic aorta is negative for dissection. No CT evidence of right heart strain.    LUNGS AND PLEURA: There are patchy groundglass bilateral pulmonary infiltrates most pronounced in the periphery of the lungs. Findings compatible with acute pneumonitis and could be seen with COVID-19 pneumonitis. Clinical correlation. Technically   indeterminate 4 mm subpleural nodule in the right middle lobe on series 7 image 172 is unchanged. No pleural effusions.    MEDIASTINUM/AXILLAE: Normal.    CORONARY ARTERY CALCIFICATION: None.    UPPER ABDOMEN: Limited visualization of the upper abdomen. Question some nodular contour of the liver with underlying hepatic parenchymal disease not excluded.    MUSCULOSKELETAL: Subacute appearing incompletely healed right second through fifth rib fractures anterolaterally. Similar findings involving the left second and third ribs anterolaterally. Question of subacute appearing nondisplaced midsternal fracture.   These findings are new compared to 6/23/2021 but again appears subacute in nature.      Impression    IMPRESSION:  1.  Negative for pulmonary embolism.    2.  Patchy bilateral groundglass pulmonary infiltrates compatible with acute pneumonitis and could be seen with COVID-19 pneumonitis.    3.  Indeterminate 4 mm nodule right middle lobe. Recommend follow-up CT in one year if there are risk factors present such as history of smoking. If no risk factors are present, no specific follow-up needed.    4.  Question some nodular contour of the liver suggesting underlying hepatic parenchymal disease.    5.  Subacute appearing incompletely  healed nondisplaced fractures involving the right second through fifth ribs, left second and third ribs, and mid sternum. These findings are all new compared to 6/23/2021 but again appears subacute in nature. Clinical   correlation.   XR Lumbar Spine 2/3 Views    Narrative    EXAM: XR LUMBAR SPINE 2-3 VIEWS  LOCATION: [facility]  DATE/TIME: 9/13/2021 4:35 PM    INDICATION: Low back pain, COVID positive.    COMPARISON: Lumbar MRI 9/23/2015.    TECHNIQUE: CR Lumbar Spine.      Impression    IMPRESSION: There are five lumbar-type vertebral bodies in normal alignment. Normal vertebral body heights without compression fracture. Moderate disc space narrowing L4-L5 and L5-S1 with mild marginal osteophytes. Mild disc space narrowing at other   levels. Moderate facet arthropathy L4-L5 and L5-S1. The sacrum and visualized pelvis are unremarkable.       Discharge Medications   Current Discharge Medication List      START taking these medications    Details   amLODIPine (NORVASC) 5 MG tablet Take 1 tablet (5 mg) by mouth daily  Qty: 30 tablet, Refills: 0    Associated Diagnoses: Essential hypertension      gabapentin (NEURONTIN) 100 MG capsule Take 2 capsules (200 mg) by mouth 3 times daily  Qty: 21 capsule, Refills: 0    Associated Diagnoses: Chronic low back pain without sciatica, unspecified back pain laterality      metFORMIN (GLUCOPHAGE) 500 MG tablet Take 1 tablet (500 mg) by mouth daily (with breakfast)  Qty: 30 tablet, Refills: 0    Associated Diagnoses: Type 2 diabetes mellitus with hyperglycemia, without long-term current use of insulin (H)         CONTINUE these medications which have NOT CHANGED    Details   DM-Phenylephrine-acetaminophen (VICKS DAYQUIL COLD & FLU) 10-5-325 MG CAPS Take 1 capsule by mouth every 6 hours as needed           Allergies   Allergies   Allergen Reactions     Morphine Sulfate Nausea     Vicodin [Hydrocodone-Acetaminophen] Nausea

## 2021-09-16 ENCOUNTER — PATIENT OUTREACH (OUTPATIENT)
Dept: CARE COORDINATION | Facility: CLINIC | Age: 61
End: 2021-09-16

## 2021-09-16 NOTE — PROGRESS NOTES
Clinic Care Coordination Contact  [facility]/Voicemail       Clinical Data: Care Coordinator Outreach  Outreach attempted x 1.  Left message on patient's voicemail with call back information and requested return call.  Plan:  Care Coordinator will try to reach patient again in 1-2 business days.

## 2021-09-16 NOTE — PLAN OF CARE
"Shift from 0700 to 1930-  Patient Dc'd to home via taxi with children. Taxi voucher given.      Problem: Gas Exchange Impaired  Goal: Optimal Gas Exchange  Outcome: Adequate for Discharge  Intervention: Optimize Oxygenation and Ventilation  Recent Flowsheet Documentation  Taken 9/15/2021 1100 by Kelsey Lynn, RN  Head of Bed (HOB) Positioning: HOB at 20-30 degrees   Lungs clear but diminished; adequate oxygen sats. VVS.    Patient very difficult most of the day; yelling intermittently at RN, CNA, and dietary. Given ativan then patient improved.   She stated she didn't have ride and wanted to fill prescriptions at near by pharmacy. SW was working on a taxi ride. Then she call RN into room at 1500 and said her grandson was here and was giving her a ride. Then she wanted to have prescriptions filled \"now at pharmacy across the street.\" So I faxed them and told her there is going to be a 1 hour wait. She didn't want to stay in room. Insisted on being at front door with grandchildren; One had just left [facility] ED and diagnosed with COVID.   Special taxi called. They arrived and took them to their destination they gave RN.   When CNA went to front door with patient, a taxi had dropped off 2 teenagers and there was no car or .   "

## 2021-09-17 ENCOUNTER — PATIENT OUTREACH (OUTPATIENT)
Dept: CARE COORDINATION | Facility: CLINIC | Age: 61
End: 2021-09-17

## 2021-09-17 LAB
BACTERIA BLD CULT: NO GROWTH
BACTERIA BLD CULT: NO GROWTH

## 2021-09-17 NOTE — PROGRESS NOTES
Contact  [facility]/Voicemail    Referral Source: Care Team  Clinical Data:  Outreach  Outreach attempted x 2.  Left message on voicemail with call back information and requested return call.  Plan:  will send care coordination introduction letter with care coordinator contact information and explanation of care coordination services.  will do no further outreaches at this time.  Dotite Lockett,   [facility]  [phone]

## 2021-09-17 NOTE — LETTER
[facility] CARE COORDINATION  [address] [facility] [address]  Phone: [phone]      September 22, 2021      Vitaly Roach  [address]  [place] [address]    Dear Vitaly,    We have been trying to reach you to introduce you to [facility] s Care Coordination program.  The goal of care coordination is to help you manage your health and improve access to the [facility] system in the most efficient manner.  The Care Coordinator is a nurse who understands the healthcare system and will assist you in improving your access to care.     As your Physician and Care Coordinator we partner to help you achieve your health care goals.     We will continue to reach out; however, if you are able to call your Care Coordinator at [facility] at  [phone], that would be appreciated.  We at [facility] are focused on providing you with the highest-quality healthcare experience possible.      It is a pleasure to partner with you as we work towards achieving your optimal state of wellness.        Sincerely,        CEDRIC Lew

## 2021-09-27 PROBLEM — F10.20 ALCOHOL USE DISORDER, SEVERE, DEPENDENCE (H): Status: ACTIVE | Noted: 2021-09-27

## 2021-09-27 PROBLEM — R94.31 PROLONGED QT INTERVAL: Status: ACTIVE | Noted: 2021-09-27

## 2021-09-27 PROBLEM — J10.1 INFLUENZA A: Status: ACTIVE | Noted: 2019-03-18

## 2021-09-27 PROBLEM — M54.9 CHRONIC BACK PAIN GREATER THAN 3 MONTHS DURATION: Status: ACTIVE | Noted: 2021-09-27

## 2021-09-27 PROBLEM — F15.90 OTHER STIMULANT USE, UNSPECIFIED, UNCOMPLICATED: Status: ACTIVE | Noted: 2021-09-27

## 2021-09-27 PROBLEM — G89.29 CHRONIC BACK PAIN GREATER THAN 3 MONTHS DURATION: Status: ACTIVE | Noted: 2021-09-27

## 2022-11-05 ENCOUNTER — HOSPITAL ENCOUNTER (EMERGENCY)
Facility: CLINIC | Age: 62
Discharge: HOME | End: 2022-11-05
Attending: FAMILY MEDICINE | Admitting: FAMILY MEDICINE
Payer: COMMERCIAL

## 2022-11-05 VITALS
RESPIRATION RATE: 16 BRPM | OXYGEN SATURATION: 99 % | BODY MASS INDEX: 31.28 KG/M2 | HEIGHT: 62 IN | HEART RATE: 78 BPM | TEMPERATURE: 98 F | WEIGHT: 170 LBS

## 2022-11-05 DIAGNOSIS — B02.9 HERPES ZOSTER WITHOUT COMPLICATION: ICD-10-CM

## 2022-11-05 DIAGNOSIS — K43.9 VENTRAL HERNIA WITHOUT OBSTRUCTION OR GANGRENE: ICD-10-CM

## 2022-11-05 PROCEDURE — 99284 EMERGENCY DEPT VISIT MOD MDM: CPT

## 2022-11-05 RX ORDER — PREDNISONE 10 MG/1
TABLET ORAL
Qty: 31 TABLET | Refills: 0 | Status: SHIPPED | OUTPATIENT
Start: 2022-11-05 | End: 2022-11-19

## 2022-11-05 RX ORDER — GABAPENTIN 300 MG/1
300 CAPSULE ORAL 3 TIMES DAILY
Qty: 30 CAPSULE | Refills: 0 | Status: SHIPPED | OUTPATIENT
Start: 2022-11-05 | End: 2022-11-15

## 2022-11-05 RX ORDER — VALACYCLOVIR HYDROCHLORIDE 1 G/1
1000 TABLET, FILM COATED ORAL 3 TIMES DAILY
Qty: 21 TABLET | Refills: 0 | Status: SHIPPED | OUTPATIENT
Start: 2022-11-05 | End: 2022-11-12

## 2022-11-05 NOTE — ED TRIAGE NOTES
Patient is with a rash with fever, she believes this is shingles. She also has a bulge in her umbilical area after surgery partial hysterectomy 5 years ago that keeps growing. No issues with bowel and bladder.

## 2023-12-31 NOTE — ED PROVIDER NOTES
EMERGENCY DEPARTMENT ENCOUNTER      NAME: Vitaly Roach  AGE: 61 year old female  YOB: 1960  MRN: [id number]  EVALUATION DATE & TIME: No admission date for patient encounter.    PCP: No Ref-Primary, Physician    ED PROVIDER: Guicho Willson M.D.    Chief Complaint   Patient presents with     Rash     Fever       FINAL IMPRESSION:  No diagnosis found.    ED COURSE & MEDICAL DECISION MAKING:    Pertinent Labs & Imaging studies personally reviewed and interpreted by me. (See chart for details)  11:47 AM Patient seen and examined, prior records reviewed.  Differential diagnosis includes but not limited to pyelonephritis, ureteral stone, aortic dissection, aortic aneurysm, musculoskeletal pain, disc herniation.  Patient presents with pain in the left side of the back and left abdomen with a rash.  She has an erythematous vesicular rash in this distribution consistent with shingles.  She will be started on valacyclovir, prednisone, and gabapentin for this.  Additionally she is concerned about an umbilical hernia.  She has a moderate sized reducible ventral hernia, no vomiting or pain to suggest incarceration or gangrene.  Surgery referral was given.  11:54 AM We discussed the plan for discharge and the patient is agreeable. Reviewed supportive cares, symptomatic treatment, outpatient follow up, and reasons to return to the Emergency Department. Patient to be discharged by ED RN.     At the conclusion of the encounter I discussed the results of all of the tests and the disposition. The questions were answered. The patient or family acknowledged understanding and was agreeable with the care plan.       PROCEDURES:   Procedures    MEDICATIONS GIVEN IN THE EMERGENCY:  Medications - No data to display    NEW PRESCRIPTIONS STARTED AT TODAY'S ER VISIT  New Prescriptions    No medications on file       =================================================================    HPI    Patient information was  Pts spouse Holly Allen added to ED texting alerts: [phone].   obtained from: Patient       Vitaly Roach is a 61 year old female with a pertinent history of hypertension, hepatitis C, diabetes mellitus, substance abuse, bipolar disorder type I, chronic back pain, hysterectomy, who presents to this ED by walking for evaluation of rash.     Three days ago (11/02/2022), patient developed rash on her back. Patient states that it has been spreading to her buttocks and left abdomen. Patient believes it is shingles.     Patient also notes hernia in ventral area. She states she had a partial hysterectomy 5 years ago and since then, the hernia has been growing. She denies any urinary symptoms or any other complaints at this time.     REVIEW OF SYSTEMS   Review of Systems   Gastrointestinal:        Positive for hernia (ventral)    Genitourinary:        Denies any urinary symptoms.    Skin: Positive for rash (back, buttocks, and left abdomen).   All other systems reviewed and are negative.     All other systems reviewed and negative    PAST MEDICAL HISTORY:  Past Medical History:   Diagnosis Date     Bipolar 1 disorder (H)      Chronic back pain      Diabetes mellitus (H)      Hepatitis C      Hepatitis C 2003     Hypertension      Substance abuse (H)        PAST SURGICAL HISTORY:  Past Surgical History:   Procedure Laterality Date     HYSTERECTOMY       TONSILLECTOMY, ADENOIDECTOMY, MYRINGOTOMY, INSERT TUBE BILATERAL, COMBINED       TUBAL LIGATION         CURRENT MEDICATIONS:    No current facility-administered medications for this encounter.     Current Outpatient Medications   Medication     amLODIPine (NORVASC) 5 MG tablet     DM-Phenylephrine-acetaminophen (VICKS DAYQUIL COLD & FLU) 10-5-325 MG CAPS     gabapentin (NEURONTIN) 100 MG capsule     metFORMIN (GLUCOPHAGE) 500 MG tablet       ALLERGIES:  Allergies   Allergen Reactions     Acetaminophen-Codeine Nausea and Vomiting     Hydrocodone      Other reaction(s): Gastrointestinal     Morphine Sulfate Nausea     Vicodin  "[Hydrocodone-Acetaminophen] Nausea       FAMILY HISTORY:  Family History   Problem Relation Age of Onset     Fibromyalgia Mother      Heart Disease Father      Breast Cancer Maternal Grandmother        SOCIAL HISTORY:   Social History     Socioeconomic History     Marital status:      Number of children: 3   Tobacco Use     Smoking status: Every Day     Smokeless tobacco: Never   Substance and Sexual Activity     Alcohol use: No     Drug use: No     Sexual activity: Yes     Partners: Male     Birth control/protection: Post-menopausal       VITALS:  Pulse 78   Temp 98  F (36.7  C)   Resp 16   Ht 1.575 m (5' 2\")   Wt 77.1 kg (170 lb)   SpO2 99%   BMI 31.09 kg/m      PHYSICAL EXAM:  Physical Exam  Vitals and nursing note reviewed.   Constitutional:       Appearance: Normal appearance.   HENT:      Head: Normocephalic and atraumatic.      Right Ear: External ear normal.      Left Ear: External ear normal.      Nose: Nose normal.   Eyes:      Extraocular Movements: Extraocular movements intact.      Conjunctiva/sclera: Conjunctivae normal.   Pulmonary:      Effort: Pulmonary effort is normal.   Abdominal:      Hernia: A hernia is present. Hernia is present in the ventral area (reducible) .   Musculoskeletal:         General: Normal range of motion.      Cervical back: Normal range of motion.      Right lower leg: No edema.      Left lower leg: No edema.   Skin:     General: Skin is warm and dry.      Findings: Rash present.      Comments: Diffuse erythema vesicular rash on back and left abdomen.     Neurological:      General: No focal deficit present.      Mental Status: She is alert and oriented to person, place, and time. Mental status is at baseline.   Psychiatric:         Mood and Affect: Mood normal.         Behavior: Behavior normal.         Thought Content: Thought content normal.          I, Cristela Muñoz, am serving as a scribe to document services personally performed by Dr. Willson based on my " observation and the provider's statements to me. I, Guicho Willson MD attest that Cristela Muñoz is acting in a scribe capacity, has observed my performance of the services and has documented them in accordance with my direction.    Guicho Willson M.D.  Emergency Medicine  [facility] EMERGENCY ROOM  [address] [facility] [address]  [phone]  Dept: [phone]     Guicho Willson MD  11/05/22 2254

## 2024-08-28 ENCOUNTER — OFFICE VISIT (OUTPATIENT)
Dept: URGENT CARE | Facility: URGENT CARE | Age: 64
End: 2024-08-28
Payer: COMMERCIAL

## 2024-08-28 VITALS
OXYGEN SATURATION: 97 % | HEART RATE: 81 BPM | TEMPERATURE: 98.2 F | RESPIRATION RATE: 18 BRPM | BODY MASS INDEX: 27.44 KG/M2 | SYSTOLIC BLOOD PRESSURE: 180 MMHG | DIASTOLIC BLOOD PRESSURE: 106 MMHG | WEIGHT: 150 LBS

## 2024-08-28 DIAGNOSIS — R30.0 DYSURIA: ICD-10-CM

## 2024-08-28 DIAGNOSIS — I10 ESSENTIAL HYPERTENSION: ICD-10-CM

## 2024-08-28 DIAGNOSIS — E11.65 TYPE 2 DIABETES MELLITUS WITH HYPERGLYCEMIA, WITHOUT LONG-TERM CURRENT USE OF INSULIN (H): ICD-10-CM

## 2024-08-28 DIAGNOSIS — R81 GLYCOSURIA: ICD-10-CM

## 2024-08-28 DIAGNOSIS — N76.0 BV (BACTERIAL VAGINOSIS): Primary | ICD-10-CM

## 2024-08-28 DIAGNOSIS — B37.31 YEAST INFECTION OF THE VAGINA: ICD-10-CM

## 2024-08-28 DIAGNOSIS — B96.89 BV (BACTERIAL VAGINOSIS): Primary | ICD-10-CM

## 2024-08-28 LAB
ALBUMIN UR-MCNC: NEGATIVE MG/DL
APPEARANCE UR: CLEAR
BILIRUB UR QL STRIP: NEGATIVE
CLUE CELLS: PRESENT
COLOR UR AUTO: YELLOW
GLUCOSE UR STRIP-MCNC: >=1000 MG/DL
HGB UR QL STRIP: NEGATIVE
KETONES UR STRIP-MCNC: NEGATIVE MG/DL
LEUKOCYTE ESTERASE UR QL STRIP: NEGATIVE
NITRATE UR QL: NEGATIVE
PH UR STRIP: 6.5 [PH] (ref 5–7)
SP GR UR STRIP: 1.01 (ref 1–1.03)
TRICHOMONAS, WET PREP: ABNORMAL
UROBILINOGEN UR STRIP-ACNC: 0.2 E.U./DL
WBC'S/HIGH POWER FIELD, WET PREP: ABNORMAL
YEAST, WET PREP: PRESENT

## 2024-08-28 PROCEDURE — 99204 OFFICE O/P NEW MOD 45 MIN: CPT | Performed by: PHYSICIAN ASSISTANT

## 2024-08-28 PROCEDURE — 81003 URINALYSIS AUTO W/O SCOPE: CPT | Performed by: PHYSICIAN ASSISTANT

## 2024-08-28 PROCEDURE — 87210 SMEAR WET MOUNT SALINE/INK: CPT | Performed by: PHYSICIAN ASSISTANT

## 2024-08-28 RX ORDER — METRONIDAZOLE 7.5 MG/G
1 GEL VAGINAL DAILY
Qty: 35 G | Refills: 0 | Status: SHIPPED | OUTPATIENT
Start: 2024-08-28 | End: 2024-09-04

## 2024-08-28 RX ORDER — POTASSIUM CHLORIDE 750 MG/1
10 TABLET, EXTENDED RELEASE ORAL DAILY
COMMUNITY
End: 2024-08-28

## 2024-08-28 RX ORDER — ASPIRIN 81 MG
1 TABLET, DELAYED RELEASE (ENTERIC COATED) ORAL DAILY
COMMUNITY
End: 2024-08-28

## 2024-08-28 RX ORDER — FLUCONAZOLE 150 MG/1
150 TABLET ORAL
Qty: 3 TABLET | Refills: 0 | Status: SHIPPED | OUTPATIENT
Start: 2024-08-28 | End: 2024-09-04

## 2024-08-28 ASSESSMENT — ENCOUNTER SYMPTOMS
RESPIRATORY NEGATIVE: 1
NECK STIFFNESS: 0
HEMATURIA: 0
NAUSEA: 0
MUSCULOSKELETAL NEGATIVE: 1
VOMITING: 0
DYSURIA: 1
LIGHT-HEADEDNESS: 0
SORE THROAT: 0
FEVER: 0
HEADACHES: 0
POLYDIPSIA: 0
NECK PAIN: 0
RHINORRHEA: 0
GASTROINTESTINAL NEGATIVE: 1
WEAKNESS: 0
ADENOPATHY: 0
DIZZINESS: 0
CHILLS: 0
MYALGIAS: 0
CONSTITUTIONAL NEGATIVE: 1
ACTIVITY CHANGE: 0
PALPITATIONS: 0
DIARRHEA: 0
CARDIOVASCULAR NEGATIVE: 1
FREQUENCY: 1
NEUROLOGICAL NEGATIVE: 1
FATIGUE: 0
SHORTNESS OF BREATH: 0
ABDOMINAL PAIN: 0
COUGH: 0
ENDOCRINE NEGATIVE: 1
FLANK PAIN: 0

## 2024-08-28 NOTE — PROGRESS NOTES
"Chief Complaint:    Chief Complaint   Patient presents with    Vaginal Problem     Pt has itching and discomfort in the vaginal area for a couple weeks, thinks it's from drainage from the bladder.   Also pt says her \"ovary hurts\"      UTI     Pt having urinary frequency and urinary incontinence for about a month.    Finger     Pt's fingers on her right hand are swollen and she can't remove her ring on the right \"ring finger\".     ASSESSMENT  1. BV (bacterial vaginosis)    2. Yeast infection of the vagina    3. Dysuria    4. Type 2 diabetes mellitus with hyperglycemia, without long-term current use of insulin (H)    5. Glycosuria    6. Essential hypertension         PLAN    2 rings on the R ring finger were removed using ring cutter and then forceps to spread rings apart.  Patient tolerated this procedure well.    Urinalysis discussed with patient  We will call with culture results if resistant.  Wet prep was positive for clue cells and yeast  Rx for Metrogel, and Diflucan today.  Patient at higher risk for severe infection with DM.  Patient has not been monitoring her blood sugars and does not take any medication for her DM for the past several years.     Patient is hypertensive in clinic today.  Second BP reading was also above goal at 180/106.  Patient is not taking any HTN medication at this time.    Patient was brought to  for appointment to establish care to restart BP and DM medication.    Worrisome symptoms discussed with instructions to go to the ED.  Patient verbalized understanding and agreed with this plan.        Labs:     Results for orders placed or performed in visit on 08/28/24   UA Macroscopic with reflex to Microscopic and Culture - Clinic Collect     Status: Abnormal    Specimen: Urine, Clean Catch   Result Value Ref Range    Color Urine Yellow Colorless, Straw, Light Yellow, Yellow    Appearance Urine Clear Clear    Glucose Urine >=1000 (A) Negative mg/dL    Bilirubin Urine Negative " Negative    Ketones Urine Negative Negative mg/dL    Specific Gravity Urine 1.010 1.003 - 1.035    Blood Urine Negative Negative    pH Urine 6.5 5.0 - 7.0    Protein Albumin Urine Negative Negative mg/dL    Urobilinogen Urine 0.2 0.2, 1.0 E.U./dL    Nitrite Urine Negative Negative    Leukocyte Esterase Urine Negative Negative    Narrative    Microscopic not indicated   Wet prep - Clinic Collect     Status: Abnormal    Specimen: Vagina; Swab   Result Value Ref Range    Trichomonas Absent Absent    Yeast Present (A) Absent    Clue Cells Present (A) Absent    WBCs/high power field 1+ (A) None       Problem history    Patient Active Problem List   Diagnosis    Lumbago    Thoracic or lumbosacral neuritis or radiculitis, unspecified    Alcohol abuse    Cardiac arrest (H)    Chronic hepatitis C virus infection (H)    Diabetes mellitus (H)    Essential hypertension    Opioid overdose, accidental or unintentional, initial encounter (H)    Thrombocytopenia (H24)    Pneumonia due to 2019 novel coronavirus    Alcohol use disorder, severe, dependence (H)    Antisocial personality disorder (H)    Chronic back pain greater than 3 months duration    Chronic pain    Combinations of drug dependence excluding opioid type drug, continuous (H)    Constipation    History of abnormal mammogram    History of alcohol abuse    History of drug abuse (H)    History of colonic polyps    Influenza A    Mixed bipolar I disorder (H)    SURINDER (obstructive sleep apnea)    Breast pain    Other stimulant use, unspecified, uncomplicated    Prolonged QT interval    Routine general medical examination at a health care facility    Severe bipolar I disorder, current or most recent episode mixed (H)    Vitamin D deficiency    Low back pain with sciatica       Current Meds    Current Outpatient Medications:     fluconazole (DIFLUCAN) 150 MG tablet, Take 1 tablet (150 mg) by mouth every 3 days for 3 doses., Disp: 3 tablet, Rfl: 0    metroNIDAZOLE (METROGEL)  0.75 % vaginal gel, Place 1 applicator (5 g) vaginally daily for 7 days., Disp: 35 g, Rfl: 0    gabapentin (NEURONTIN) 300 MG capsule, Take 1 capsule (300 mg) by mouth 3 times daily for 10 days, Disp: 30 capsule, Rfl: 0    valACYclovir (VALTREX) 1000 mg tablet, Take 1 tablet (1,000 mg) by mouth 3 times daily for 7 days, Disp: 21 tablet, Rfl: 0    Allergies  Allergies   Allergen Reactions    Acetaminophen-Codeine Nausea and Vomiting    Acetaminophen-Codeine Nausea and Vomiting    Hydrocodone GI Disturbance     Other reaction(s): Gastrointestinal    Hydrocodone-Acetaminophen Nausea and Nausea and Vomiting     Tolerates Percocet    Morphine Sulfate Nausea    Vicodin [Hydrocodone-Acetaminophen] Nausea       SUBJECTIVE    HPI:  Vitaly Roach is a 63 year old female who has symptoms of vaginal itching and dysuria for 2 week(s).  She has been having frequency and urgency for 1 month.  she denies back pain, nausea, vomiting, fever, and chills, flank pain, vaginal discharge, and vaginal odor.    Patient is also concerned about 2 rings on her R ring finger.  Her finger have swollen and she can not remove the rings and this is now causing her pain.      Patient has not been to the doctor in some time and is not taking any HTN medication or DM medication.      Patient is new to [facility].      ROS:      Review of Systems   Constitutional: Negative.  Negative for activity change, chills, fatigue and fever.   HENT:  Negative for congestion, ear pain, rhinorrhea and sore throat.    Respiratory: Negative.  Negative for cough and shortness of breath.    Cardiovascular: Negative.  Negative for chest pain and palpitations.   Gastrointestinal: Negative.  Negative for abdominal pain, diarrhea, nausea and vomiting.   Endocrine: Negative.  Negative for polydipsia and polyuria.   Genitourinary:  Positive for dysuria, frequency and urgency. Negative for flank pain, hematuria, pelvic pain, vaginal discharge and vaginal pain.         Vaginal Itching   Musculoskeletal: Negative.  Negative for myalgias, neck pain and neck stiffness.   Allergic/Immunologic: Negative for immunocompromised state.   Neurological: Negative.  Negative for dizziness, weakness, light-headedness and headaches.   Hematological:  Negative for adenopathy.       Family History   Family History   Problem Relation Age of Onset    Fibromyalgia Mother     Heart Disease Father     Breast Cancer Maternal Grandmother         Social History  Social History     Socioeconomic History    Marital status:      Spouse name: Not on file    Number of children: 3    Years of education: Not on file    Highest education level: Not on file   Occupational History    Not on file   Tobacco Use    Smoking status: Some Days     Types: Cigarettes    Smokeless tobacco: Never   Vaping Use    Vaping status: Never Used   Substance and Sexual Activity    Alcohol use: No    Drug use: No    Sexual activity: Yes     Partners: Male     Birth control/protection: Post-menopausal   Other Topics Concern    Not on file   Social History Narrative    Not on file     Social Determinants of Health     Financial Resource Strain: Medium Risk (2/24/2024)    Received from [facility]    Financial Resource Strain     Difficulty of Paying Living Expenses: Not on file     Difficulty of Paying Living Expenses: 3   Food Insecurity: Food Insecurity Present (2/24/2024)    Received from [facility]    Food Insecurity     Worried About Running Out of Food in the Last Year: 2   Transportation Needs: Unmet Transportation Needs (2/24/2024)    Received from [facility]    Transportation Needs     Lack of Transportation (Medical): 2   Physical Activity: Not on file   Stress: Not on file   Social Connections: Socially Integrated (2/24/2024)    Received from [facility]    Social Connections      Frequency of Communication with Friends and Family: 0   Interpersonal Safety: Not on file   Housing Stability: Medium Risk (2/24/2024)    Received from [facility]    Housing Stability     Unable to Pay for Housing in the Last Year: 2           OBJECTIVE     Vital signs noted and reviewed by Da Lowry PA-C  BP (!) 180/106   Pulse 81   Temp 98.2  F (36.8  C) (Tympanic)   Resp 18   Wt 68 kg (150 lb)   SpO2 97%   BMI 27.44 kg/m       Physical Exam  Vitals and nursing note reviewed.   Constitutional:       General: She is not in acute distress.     Appearance: Normal appearance. She is well-developed. She is not ill-appearing, toxic-appearing or diaphoretic.   HENT:      Head: Normocephalic and atraumatic.      Right Ear: Tympanic membrane and external ear normal.      Left Ear: Tympanic membrane and external ear normal.   Eyes:      Pupils: Pupils are equal, round, and reactive to light.   Cardiovascular:      Rate and Rhythm: Normal rate and regular rhythm.      Heart sounds: Normal heart sounds. No murmur heard.     No friction rub. No gallop.   Pulmonary:      Effort: Pulmonary effort is normal. No respiratory distress.      Breath sounds: Normal breath sounds. No wheezing or rales.   Chest:      Chest wall: No tenderness.   Abdominal:      General: Bowel sounds are normal. There is no distension.      Palpations: Abdomen is soft. Abdomen is not rigid. There is no mass.      Tenderness: There is no abdominal tenderness. There is no guarding or rebound. Negative signs include Aguirre's sign and McBurney's sign.   Musculoskeletal:      Right hand: Swelling present.      Cervical back: Normal range of motion and neck supple.      Comments: Edema of the R fingers.  2 rings on R ring finger that can not be removed.     Lymphadenopathy:      Cervical: No cervical adenopathy.   Skin:     General: Skin is warm and dry.   Neurological:      Mental Status: She is alert and oriented  to person, place, and time.      Cranial Nerves: No cranial nerve deficit.      Deep Tendon Reflexes: Reflexes are normal and symmetric.   Psychiatric:         Behavior: Behavior normal. Behavior is cooperative.         Thought Content: Thought content normal.         Judgment: Judgment normal.             Da Lowry PA-C  8/28/2024, 4:09 PM

## 2024-09-03 ENCOUNTER — TELEPHONE (OUTPATIENT)
Dept: FAMILY MEDICINE | Facility: CLINIC | Age: 64
End: 2024-09-03

## 2024-09-03 ENCOUNTER — OFFICE VISIT (OUTPATIENT)
Dept: FAMILY MEDICINE | Facility: CLINIC | Age: 64
End: 2024-09-03
Payer: COMMERCIAL

## 2024-09-03 VITALS
RESPIRATION RATE: 20 BRPM | OXYGEN SATURATION: 96 % | HEIGHT: 62 IN | BODY MASS INDEX: 27.42 KG/M2 | WEIGHT: 149 LBS | TEMPERATURE: 97.7 F | DIASTOLIC BLOOD PRESSURE: 90 MMHG | HEART RATE: 82 BPM | SYSTOLIC BLOOD PRESSURE: 160 MMHG

## 2024-09-03 DIAGNOSIS — F32.2 CURRENT SEVERE EPISODE OF MAJOR DEPRESSIVE DISORDER WITHOUT PSYCHOTIC FEATURES WITHOUT PRIOR EPISODE (H): ICD-10-CM

## 2024-09-03 DIAGNOSIS — E11.65 TYPE 2 DIABETES MELLITUS WITH HYPERGLYCEMIA, WITHOUT LONG-TERM CURRENT USE OF INSULIN (H): Primary | ICD-10-CM

## 2024-09-03 DIAGNOSIS — N89.8 VAGINAL DISCHARGE: ICD-10-CM

## 2024-09-03 DIAGNOSIS — I10 ESSENTIAL HYPERTENSION: ICD-10-CM

## 2024-09-03 DIAGNOSIS — Z76.89 ENCOUNTER TO ESTABLISH CARE: ICD-10-CM

## 2024-09-03 DIAGNOSIS — R32 URINARY INCONTINENCE, UNSPECIFIED TYPE: ICD-10-CM

## 2024-09-03 DIAGNOSIS — R19.05 PERIUMBILICAL MASS: ICD-10-CM

## 2024-09-03 LAB
ALBUMIN SERPL BCG-MCNC: 4.2 G/DL (ref 3.5–5.2)
ALP SERPL-CCNC: 147 U/L (ref 40–150)
ALT SERPL W P-5'-P-CCNC: 19 U/L (ref 0–50)
ANION GAP SERPL CALCULATED.3IONS-SCNC: 12 MMOL/L (ref 7–15)
AST SERPL W P-5'-P-CCNC: 18 U/L (ref 0–45)
BILIRUB SERPL-MCNC: 0.4 MG/DL
BUN SERPL-MCNC: 17.5 MG/DL (ref 8–23)
CALCIUM SERPL-MCNC: 9.4 MG/DL (ref 8.8–10.4)
CHLORIDE SERPL-SCNC: 98 MMOL/L (ref 98–107)
CHOLEST SERPL-MCNC: 191 MG/DL
CLUE CELLS: ABNORMAL
CREAT SERPL-MCNC: 0.8 MG/DL (ref 0.51–0.95)
CREAT UR-MCNC: 36.3 MG/DL
EGFRCR SERPLBLD CKD-EPI 2021: 83 ML/MIN/1.73M2
ERYTHROCYTE [DISTWIDTH] IN BLOOD BY AUTOMATED COUNT: 11.6 % (ref 10–15)
FASTING STATUS PATIENT QL REPORTED: NO
FASTING STATUS PATIENT QL REPORTED: NO
GLUCOSE SERPL-MCNC: 573 MG/DL (ref 70–99)
HBA1C MFR BLD: >15 % (ref 0–5.6)
HCO3 SERPL-SCNC: 23 MMOL/L (ref 22–29)
HCT VFR BLD AUTO: 46.7 % (ref 35–47)
HDLC SERPL-MCNC: 43 MG/DL
HGB BLD-MCNC: 16.2 G/DL (ref 11.7–15.7)
LDLC SERPL CALC-MCNC: 84 MG/DL
MCH RBC QN AUTO: 30.5 PG (ref 26.5–33)
MCHC RBC AUTO-ENTMCNC: 34.7 G/DL (ref 31.5–36.5)
MCV RBC AUTO: 88 FL (ref 78–100)
MICROALBUMIN UR-MCNC: <12 MG/L
MICROALBUMIN/CREAT UR: NORMAL MG/G{CREAT}
NONHDLC SERPL-MCNC: 148 MG/DL
PLATELET # BLD AUTO: 195 10E3/UL (ref 150–450)
POTASSIUM SERPL-SCNC: 4.4 MMOL/L (ref 3.4–5.3)
PROT SERPL-MCNC: 7.1 G/DL (ref 6.4–8.3)
RBC # BLD AUTO: 5.32 10E6/UL (ref 3.8–5.2)
SODIUM SERPL-SCNC: 133 MMOL/L (ref 135–145)
TRICHOMONAS, WET PREP: ABNORMAL
TRIGL SERPL-MCNC: 322 MG/DL
TSH SERPL DL<=0.005 MIU/L-ACNC: 2.05 UIU/ML (ref 0.3–4.2)
WBC # BLD AUTO: 6.8 10E3/UL (ref 4–11)
WBC'S/HIGH POWER FIELD, WET PREP: ABNORMAL
YEAST, WET PREP: PRESENT

## 2024-09-03 PROCEDURE — 84443 ASSAY THYROID STIM HORMONE: CPT | Performed by: FAMILY MEDICINE

## 2024-09-03 PROCEDURE — G0008 ADMIN INFLUENZA VIRUS VAC: HCPCS | Performed by: FAMILY MEDICINE

## 2024-09-03 PROCEDURE — 85027 COMPLETE CBC AUTOMATED: CPT | Performed by: FAMILY MEDICINE

## 2024-09-03 PROCEDURE — 36415 COLL VENOUS BLD VENIPUNCTURE: CPT | Performed by: FAMILY MEDICINE

## 2024-09-03 PROCEDURE — 90673 RIV3 VACCINE NO PRESERV IM: CPT | Performed by: FAMILY MEDICINE

## 2024-09-03 PROCEDURE — 83036 HEMOGLOBIN GLYCOSYLATED A1C: CPT | Performed by: FAMILY MEDICINE

## 2024-09-03 PROCEDURE — 87210 SMEAR WET MOUNT SALINE/INK: CPT | Performed by: FAMILY MEDICINE

## 2024-09-03 PROCEDURE — 82570 ASSAY OF URINE CREATININE: CPT | Performed by: FAMILY MEDICINE

## 2024-09-03 PROCEDURE — 82043 UR ALBUMIN QUANTITATIVE: CPT | Performed by: FAMILY MEDICINE

## 2024-09-03 PROCEDURE — 99214 OFFICE O/P EST MOD 30 MIN: CPT | Mod: 25 | Performed by: FAMILY MEDICINE

## 2024-09-03 PROCEDURE — 80061 LIPID PANEL: CPT | Performed by: FAMILY MEDICINE

## 2024-09-03 PROCEDURE — 80053 COMPREHEN METABOLIC PANEL: CPT | Performed by: FAMILY MEDICINE

## 2024-09-03 RX ORDER — LOSARTAN POTASSIUM 25 MG/1
25 TABLET ORAL DAILY
Qty: 90 TABLET | Refills: 0 | Status: SHIPPED | OUTPATIENT
Start: 2024-09-03 | End: 2024-12-02

## 2024-09-03 ASSESSMENT — PAIN SCALES - GENERAL: PAINLEVEL: MILD PAIN (3)

## 2024-09-03 NOTE — NURSING NOTE
"Chief Complaint   Patient presents with    Mass     Abdominal mass for 2 years after surgery she noted getting larger and hurts.    Referral     Breast reduction        Initial BP (!) 160/90   Pulse 82   Temp 97.7  F (36.5  C) (Tympanic)   Resp 20   Ht 1.575 m (5' 2\")   Wt 67.6 kg (149 lb)   LMP  (LMP Unknown)   SpO2 96%   BMI 27.25 kg/m   Estimated body mass index is 27.25 kg/m  as calculated from the following:    Height as of this encounter: 1.575 m (5' 2\").    Weight as of this encounter: 67.6 kg (149 lb).    Patient presents to the clinic using No DME    Is there anyone who you would like to be able to receive your results? No  If yes have patient fill out MARGY      "

## 2024-09-03 NOTE — PROGRESS NOTES
"  Assessment & Plan     Type 2 diabetes mellitus with hyperglycemia, without long-term current use of insulin (H)  Concerned for uncontrolled diabetes due to current symptoms, see hpi  Recommend close followup  - Lipid panel reflex to direct LDL Non-fasting; Future  - Albumin Random Urine Quantitative with Creat Ratio; Future  - HEMOGLOBIN A1C; Future  - TSH with free T4 reflex; Future  - Albumin Random Urine Quantitative with Creat Ratio  - TSH with free T4 reflex  - HEMOGLOBIN A1C  - Lipid panel reflex to direct LDL Non-fasting    Essential hypertension  Not well controlled  - Comprehensive metabolic panel (BMP + Alb, Alk Phos, ALT, AST, Total. Bili, TP); Future  - CBC with platelets; Future  - losartan (COZAAR) 25 MG tablet; Take 1 tablet (25 mg) by mouth daily.  - CBC with platelets  - Comprehensive metabolic panel (BMP + Alb, Alk Phos, ALT, AST, Total. Bili, TP)    Periumbilical mass  Small protruding mass with increased abdominal pressure  Causing some discomfort  - CT Abdomen Pelvis w/o Contrast; Future    Urinary incontinence, unspecified type  As above    Vaginal discharge  - Wet prep - Clinic Collect    Encounter to establish care    Current severe episode of major depressive disorder without psychotic features without prior episode (H)  Patient wanting to restart vyvanse  Complicated psychiatric history  Recommend restarting care with psychiatry  - Adult Mental Health  Referral; Future  - Adult Mental Health  Referral; Future          Nicotine/Tobacco Cessation  She reports that she has been smoking cigarettes. She has never used smokeless tobacco.  Nicotine/Tobacco Cessation Plan  Information offered: Patient not interested at this time      BMI  Estimated body mass index is 27.25 kg/m  as calculated from the following:    Height as of this encounter: 1.575 m (5' 2\").    Weight as of this encounter: 67.6 kg (149 lb).             Adam Haider is a 63 year old, presenting for the " following health issues:  Mass (Abdominal mass for 2 years after surgery she noted getting larger and hurts.) and Referral (Breast reduction )    History of Present Illness       Reason for visit:  Hernia in stomach area and uinary spillage clear and sticky and erormous amounts ater lying down to sleep and exstremly thirsty most of the day also nee new primary and full physical and refferals for breast and colon testingas well as refferal to Dentist  Symptom onset:  1-2 weeks ago  Symptoms include:  A large amount of urine spillage after lying down not making it to toilet very clear and sticky also pain in stomach area where theres a fist sz Hernia thats bren growing since a surgery 2years ago my eyes have changed i cant see to read at all without gl  Symptom intensity:  Severe  Symptom progression:  Staying the same  Had these symptoms before:  No  What makes it worse:  Drinking alot of liquids then lying down and awakening to get to toilet but spilling begore gettint there hae no control  What makes it better:  Not drinking so much during the day and not lifting heavey objects keeping a positve mindset and attitude  also been very tired falling asleep in seconds several times a day cant keep eyes opened and they hurt alot   She is taking medications regularly.   Concern - Thirsty and has noted decrease in vision   Onset: two weeks worse  Description: Thirsty, urine leakage worse after laying down and decrease of vision. Increased urination at night.  Intensity: moderate, severe  Progression of Symptoms:  worsening and intermittent  Accompanying Signs & Symptoms: see above  Previous history of similar problem: ???  Precipitating factors:        Worsened by: laying down  Alleviating factors:        Improved by: ?  Therapies tried and outcome:  none   Concern - Mass rt abdomen  Onset: years  Description: is getting worse  Intensity: moderate  Progression of Symptoms:  worsening  Accompanying Signs & Symptoms:  "bulging  Previous history of similar problem: yes  Precipitating factors:        Worsened by: lifting   Alleviating factors:        Improved by:   Therapies tried and outcome:  none     Concern - Would like to restart Vyvanse for ADHD  Onset: life  Therapies tried and outcome: not on meds for years.    Past surgical history: Hysterectomy in 2011, tubal ligation in 1988                     Objective    BP (!) 160/90   Pulse 82   Temp 97.7  F (36.5  C) (Tympanic)   Resp 20   Ht 1.575 m (5' 2\")   Wt 67.6 kg (149 lb)   LMP  (LMP Unknown)   SpO2 96%   BMI 27.25 kg/m    Body mass index is 27.25 kg/m .  Physical Exam  Vitals reviewed.   Cardiovascular:      Rate and Rhythm: Normal rate.      Pulses: Normal pulses.   Abdominal:      General: Abdomen is flat.      Palpations: Abdomen is soft. There is mass.      Tenderness: There is no abdominal tenderness. There is no guarding or rebound.   Neurological:      Mental Status: She is alert and oriented to person, place, and time.                    Signed Electronically by: Cathy Silva MD    "

## 2024-09-03 NOTE — LETTER
9/3/2024    Renzo Carranza   1960        To Whom it May Concern;    Medication List  Patient: RENZO CARRANZA : 1960 Physician: Cathy Silva MD     This is your record.  Keep this with you and show to your community pharmacist(s) and physician(s) at each visit.    Allergies: PERCOCET [OXYCODONE-ACETAMINOPHEN] - Shortness Of Breath     ACETAMINOPHEN-CODEINE - Nausea and Vomiting     ACETAMINOPHEN-CODEINE - Nausea and Vomiting     HYDROCODONE - GI Disturbance     HYDROCODONE-ACETAMINOPHEN - Nausea,Nausea and Vomiting     MORPHINE SULFATE - Nausea     VICODIN [HYDROCODONE-ACETAMINOPHEN] - Nausea            Medications  Valid as of: 2024 -  2:58    Sincerely,        Reba Jaquez [facility] for Cathy Silva MD

## 2024-09-04 ENCOUNTER — TELEPHONE (OUTPATIENT)
Dept: FAMILY MEDICINE | Facility: CLINIC | Age: 64
End: 2024-09-04
Payer: COMMERCIAL

## 2024-09-04 DIAGNOSIS — E11.65 TYPE 2 DIABETES MELLITUS WITH HYPERGLYCEMIA, WITHOUT LONG-TERM CURRENT USE OF INSULIN (H): Primary | ICD-10-CM

## 2024-09-04 RX ORDER — METFORMIN HCL 500 MG
500 TABLET, EXTENDED RELEASE 24 HR ORAL 2 TIMES DAILY WITH MEALS
Qty: 60 TABLET | Refills: 0 | Status: SHIPPED | OUTPATIENT
Start: 2024-09-04 | End: 2024-09-12

## 2024-09-04 NOTE — TELEPHONE ENCOUNTER
Telephone call for critical lab.patient had been without medical care seen today for multiple concerns. A1c >15 glucose 570+ electrolytes are ok and there is no evidence of ketoacidosis. Will message primary to have he rfollow up tomorrow as this is not emergent. Note not finished but enough information to see patient was alert and oriented. Cristina Harmon M.D.

## 2024-09-04 NOTE — TELEPHONE ENCOUNTER
Unable to contact patient, son or daughter in law  Patient has uncontrolled diabetes with very high blood sugars  Please assist with letting patient know she has insulin, metformin and a referral to diabetic educator ordered. She should go get her medication so she can start asap. Please schedule appointment with me as soon as she can. If she feels severe vision changes, nausea, vomiting, lightheadedness, she should go to the ED.

## 2024-09-04 NOTE — LETTER
September 10, 2024      Vitaly Roach  [address]   [place] [id number]        Dear Jamaal Haider,     We have been trying to get a hold of you regarding your high blood sugars. Dr. Silva sent over some prescriptions to the [facility] in [place] that are ready for . She would like to do a follow up with you as soon as you are able.  You had a blood pressure check with an RN scheduled today 9/10/24 at 1pm but you did not show up, please reschedule this visit.      I see you also have your diabetic educator appointment with Angela Reis on the 9/17. Please call the care team to schedule your follow-up with Dr. Silva at [phone].      She also wanted you to know if you experience any severe vision changes, nausea, vomiting, lightheadedness you should go to the Emergency Department to be evaluated.         Sincerely,        Julie Behrendt RN

## 2024-09-05 NOTE — TELEPHONE ENCOUNTER
Left message with Vitaly to call back. Son's number did not work. Did not leave message with Bernice as no C2C on file.    Mara London RN on 9/5/2024 at 12:46 PM

## 2024-09-09 NOTE — TELEPHONE ENCOUNTER
Attempt 2 - Left message for patient to call back. Will try again later today.    Verified with Pharmacy patient has not yet picked up medications.  Mara London RN on 9/9/2024 at 10:14 AM

## 2024-09-09 NOTE — TELEPHONE ENCOUNTER
Attempt #3 - Left message with Vitaly's number to call back. Son's phone number is not in service. Someone answered daughter in law's phone number but they stated neither a Bernice or Vitaly was available.    Mara London RN on 9/9/2024 at 4:34 PM

## 2024-09-10 NOTE — TELEPHONE ENCOUNTER
4th attempt to reach patient, left message on undientified VM requesting she call the care team back. shoplyharAlephD message is unread. Letter sent via [facility] and also mailed out to patients home address.    Julie Behrendt RN

## 2024-09-12 ENCOUNTER — ALLIED HEALTH/NURSE VISIT (OUTPATIENT)
Dept: FAMILY MEDICINE | Facility: CLINIC | Age: 64
End: 2024-09-12
Payer: COMMERCIAL

## 2024-09-12 ENCOUNTER — TELEPHONE (OUTPATIENT)
Dept: FAMILY MEDICINE | Facility: CLINIC | Age: 64
End: 2024-09-12

## 2024-09-12 VITALS — SYSTOLIC BLOOD PRESSURE: 160 MMHG | DIASTOLIC BLOOD PRESSURE: 100 MMHG | HEART RATE: 80 BPM

## 2024-09-12 DIAGNOSIS — E11.65 TYPE 2 DIABETES MELLITUS WITH HYPERGLYCEMIA, WITHOUT LONG-TERM CURRENT USE OF INSULIN (H): ICD-10-CM

## 2024-09-12 PROCEDURE — 99207 PR NO CHARGE NURSE ONLY: CPT

## 2024-09-12 RX ORDER — METFORMIN HCL 500 MG
500 TABLET, EXTENDED RELEASE 24 HR ORAL 2 TIMES DAILY WITH MEALS
Qty: 60 TABLET | Refills: 0 | Status: SHIPPED | OUTPATIENT
Start: 2024-09-12 | End: 2024-09-17

## 2024-09-12 NOTE — TELEPHONE ENCOUNTER
Vitaly Roach is a 63 year old year old patient who comes in today for a Blood Pressure check because of new medication and ongoing blood pressure monitoring.  Vital Signs as repeated by /98  Patient is taking medication as prescribed  Patient is tolerating medications well.  Patient is not monitoring Blood Pressure at home.    Current complaints: none, see note below, has lots of questions about diabetes and referrals        Vitaly also states she got a letter with her lab results and has questions about her new diagnosis of diabetes. She has not picked up her insulin or Metformin she asks we transfer those prescriptions to the pharmacy here in [place] so she can start them today. I also sent in Rx for glucose monitor and supplies. She has an appointment with diabetic educator on Tuesday and I made her a follow up appointment with Dr Silva after that appointment to address her hypertension. Her BP is elevated today, she says she did take her Losartan 25 mg today.      Vitaly also asking for help to schedule mammogram, colonoscopy and psychiatry appointments. I printed these referrals and wrote the phone numbers down for her to call and schedule these appointments. I advised her to come back to clinic today after pharmacy if she has any questions about giving herself her insulin or using the glucose meter. She agrees to come back with questions

## 2024-09-12 NOTE — PROGRESS NOTES
Vitaly Roach is a 63 year old year old patient who comes in today for a Blood Pressure check because of new medication and ongoing blood pressure monitoring.  Vital Signs as repeated by /98  Patient is taking medication as prescribed  Patient is tolerating medications well.  Patient is not monitoring Blood Pressure at home.    Current complaints: none, see note below, has lots of questions about diabetes and referrals        Vitaly also states she got a letter with her lab results and has questions about her new diagnosis of diabetes. She has not picked up her insulin or Metformin she asks we transfer those prescriptions to the pharmacy here in [place] so she can start them today. I also sent in Rx for glucose monitor and supplies. She has an appointment with diabetic educator on Tuesday and I made her a follow up appointment with Dr Silva after that appointment to address her hypertension. Her BP is elevated today, she says she did take her Losartan 25 mg today.      Vitaly also asking for help to schedule mammogram, colonoscopy and psychiatry appointments. I printed these referrals and wrote the phone numbers down for her to call and schedule these appointments. I advised her to come back to clinic today after pharmacy if she has any questions about giving herself her insulin or using the glucose meter. She agrees to come back with questions

## 2024-09-17 ENCOUNTER — OFFICE VISIT (OUTPATIENT)
Dept: FAMILY MEDICINE | Facility: CLINIC | Age: 64
End: 2024-09-17
Payer: COMMERCIAL

## 2024-09-17 ENCOUNTER — ALLIED HEALTH/NURSE VISIT (OUTPATIENT)
Dept: EDUCATION SERVICES | Facility: CLINIC | Age: 64
End: 2024-09-17
Attending: FAMILY MEDICINE
Payer: COMMERCIAL

## 2024-09-17 VITALS
RESPIRATION RATE: 20 BRPM | DIASTOLIC BLOOD PRESSURE: 102 MMHG | TEMPERATURE: 97.2 F | SYSTOLIC BLOOD PRESSURE: 176 MMHG | OXYGEN SATURATION: 99 % | HEIGHT: 62 IN | WEIGHT: 149 LBS | BODY MASS INDEX: 27.42 KG/M2 | HEART RATE: 64 BPM

## 2024-09-17 DIAGNOSIS — E11.65 TYPE 2 DIABETES MELLITUS WITH HYPERGLYCEMIA, WITHOUT LONG-TERM CURRENT USE OF INSULIN (H): ICD-10-CM

## 2024-09-17 DIAGNOSIS — I25.10 CORONARY ARTERY DISEASE INVOLVING NATIVE HEART WITHOUT ANGINA PECTORIS, UNSPECIFIED VESSEL OR LESION TYPE: ICD-10-CM

## 2024-09-17 DIAGNOSIS — R10.2 PELVIC PAIN IN FEMALE: ICD-10-CM

## 2024-09-17 DIAGNOSIS — Z79.899 MEDICATION MANAGEMENT: ICD-10-CM

## 2024-09-17 DIAGNOSIS — N89.8 VAGINAL DISCHARGE: ICD-10-CM

## 2024-09-17 DIAGNOSIS — Z12.31 VISIT FOR SCREENING MAMMOGRAM: ICD-10-CM

## 2024-09-17 DIAGNOSIS — B37.31 YEAST INFECTION OF THE VAGINA: ICD-10-CM

## 2024-09-17 DIAGNOSIS — B18.2 CHRONIC HEPATITIS C WITHOUT HEPATIC COMA (H): ICD-10-CM

## 2024-09-17 DIAGNOSIS — F60.2 ANTISOCIAL PERSONALITY DISORDER (H): ICD-10-CM

## 2024-09-17 DIAGNOSIS — F32.2 CURRENT SEVERE EPISODE OF MAJOR DEPRESSIVE DISORDER WITHOUT PSYCHOTIC FEATURES WITHOUT PRIOR EPISODE (H): ICD-10-CM

## 2024-09-17 DIAGNOSIS — I10 ESSENTIAL HYPERTENSION: Primary | ICD-10-CM

## 2024-09-17 PROBLEM — F10.20 ALCOHOL USE DISORDER, SEVERE, DEPENDENCE (H): Status: RESOLVED | Noted: 2021-09-27 | Resolved: 2024-09-17

## 2024-09-17 PROBLEM — T40.2X1A OPIOID OVERDOSE, ACCIDENTAL OR UNINTENTIONAL, INITIAL ENCOUNTER (H): Status: RESOLVED | Noted: 2021-06-22 | Resolved: 2024-09-17

## 2024-09-17 PROBLEM — I46.9 CARDIAC ARREST (H): Status: RESOLVED | Noted: 2021-09-11 | Resolved: 2024-09-17

## 2024-09-17 PROBLEM — D69.6 THROMBOCYTOPENIA (H): Status: RESOLVED | Noted: 2021-09-11 | Resolved: 2024-09-17

## 2024-09-17 LAB
ALBUMIN SERPL BCG-MCNC: 4.4 G/DL (ref 3.5–5.2)
ALP SERPL-CCNC: 196 U/L (ref 40–150)
ALT SERPL W P-5'-P-CCNC: 19 U/L (ref 0–50)
ANION GAP SERPL CALCULATED.3IONS-SCNC: 8 MMOL/L (ref 7–15)
AST SERPL W P-5'-P-CCNC: 18 U/L (ref 0–45)
BILIRUB SERPL-MCNC: 0.5 MG/DL
BUN SERPL-MCNC: 12.6 MG/DL (ref 8–23)
CALCIUM SERPL-MCNC: 9.9 MG/DL (ref 8.8–10.4)
CHLORIDE SERPL-SCNC: 97 MMOL/L (ref 98–107)
CLUE CELLS: ABNORMAL
CREAT SERPL-MCNC: 0.65 MG/DL (ref 0.51–0.95)
EGFRCR SERPLBLD CKD-EPI 2021: >90 ML/MIN/1.73M2
ERYTHROCYTE [DISTWIDTH] IN BLOOD BY AUTOMATED COUNT: 11.4 % (ref 10–15)
GLUCOSE SERPL-MCNC: 438 MG/DL (ref 70–99)
HCO3 SERPL-SCNC: 29 MMOL/L (ref 22–29)
HCT VFR BLD AUTO: 47.6 % (ref 35–47)
HCV AB SERPL QL IA: REACTIVE
HGB BLD-MCNC: 16.2 G/DL (ref 11.7–15.7)
MCH RBC QN AUTO: 30.2 PG (ref 26.5–33)
MCHC RBC AUTO-ENTMCNC: 34 G/DL (ref 31.5–36.5)
MCV RBC AUTO: 89 FL (ref 78–100)
PLATELET # BLD AUTO: 184 10E3/UL (ref 150–450)
POTASSIUM SERPL-SCNC: 4.5 MMOL/L (ref 3.4–5.3)
PROT SERPL-MCNC: 7.5 G/DL (ref 6.4–8.3)
RBC # BLD AUTO: 5.37 10E6/UL (ref 3.8–5.2)
SODIUM SERPL-SCNC: 134 MMOL/L (ref 135–145)
TRICHOMONAS, WET PREP: ABNORMAL
WBC # BLD AUTO: 6.9 10E3/UL (ref 4–11)
WBC'S/HIGH POWER FIELD, WET PREP: ABNORMAL
YEAST, WET PREP: PRESENT

## 2024-09-17 PROCEDURE — G2211 COMPLEX E/M VISIT ADD ON: HCPCS | Performed by: FAMILY MEDICINE

## 2024-09-17 PROCEDURE — G0108 DIAB MANAGE TRN  PER INDIV: HCPCS | Performed by: DIETITIAN, REGISTERED

## 2024-09-17 PROCEDURE — 86803 HEPATITIS C AB TEST: CPT | Performed by: FAMILY MEDICINE

## 2024-09-17 PROCEDURE — 36415 COLL VENOUS BLD VENIPUNCTURE: CPT | Performed by: FAMILY MEDICINE

## 2024-09-17 PROCEDURE — 87210 SMEAR WET MOUNT SALINE/INK: CPT | Performed by: FAMILY MEDICINE

## 2024-09-17 PROCEDURE — 80053 COMPREHEN METABOLIC PANEL: CPT | Performed by: FAMILY MEDICINE

## 2024-09-17 PROCEDURE — 85027 COMPLETE CBC AUTOMATED: CPT | Performed by: FAMILY MEDICINE

## 2024-09-17 PROCEDURE — 99207 PR FOOT EXAM NO CHARGE: CPT | Performed by: FAMILY MEDICINE

## 2024-09-17 PROCEDURE — 99214 OFFICE O/P EST MOD 30 MIN: CPT | Performed by: FAMILY MEDICINE

## 2024-09-17 PROCEDURE — 87522 HEPATITIS C REVRS TRNSCRPJ: CPT | Performed by: FAMILY MEDICINE

## 2024-09-17 RX ORDER — LOSARTAN POTASSIUM 25 MG/1
25 TABLET ORAL DAILY
Qty: 90 TABLET | Refills: 0 | Status: CANCELLED | OUTPATIENT
Start: 2024-09-17

## 2024-09-17 RX ORDER — LOSARTAN POTASSIUM AND HYDROCHLOROTHIAZIDE 12.5; 5 MG/1; MG/1
1 TABLET ORAL DAILY
Qty: 60 TABLET | Refills: 0 | Status: SHIPPED | OUTPATIENT
Start: 2024-09-17

## 2024-09-17 RX ORDER — CLOTRIMAZOLE 1 %
1 CREAM WITH APPLICATOR VAGINAL AT BEDTIME
Qty: 45 G | Refills: 0 | Status: SHIPPED | OUTPATIENT
Start: 2024-09-17 | End: 2024-09-24

## 2024-09-17 RX ORDER — METFORMIN HCL 500 MG
500 TABLET, EXTENDED RELEASE 24 HR ORAL 2 TIMES DAILY WITH MEALS
Qty: 60 TABLET | Refills: 0 | Status: SHIPPED | OUTPATIENT
Start: 2024-09-17

## 2024-09-17 RX ORDER — BLOOD-GLUCOSE SENSOR
EACH MISCELLANEOUS
Qty: 2 EACH | Refills: 5 | Status: SHIPPED | OUTPATIENT
Start: 2024-09-17

## 2024-09-17 RX ORDER — KETOROLAC TROMETHAMINE 30 MG/ML
1 INJECTION, SOLUTION INTRAMUSCULAR; INTRAVENOUS ONCE
Qty: 1 EACH | Refills: 0 | Status: SHIPPED | OUTPATIENT
Start: 2024-09-17 | End: 2024-09-17

## 2024-09-17 ASSESSMENT — PATIENT HEALTH QUESTIONNAIRE - PHQ9
SUM OF ALL RESPONSES TO PHQ QUESTIONS 1-9: 9
SUM OF ALL RESPONSES TO PHQ QUESTIONS 1-9: 9
10. IF YOU CHECKED OFF ANY PROBLEMS, HOW DIFFICULT HAVE THESE PROBLEMS MADE IT FOR YOU TO DO YOUR WORK, TAKE CARE OF THINGS AT HOME, OR GET ALONG WITH OTHER PEOPLE: SOMEWHAT DIFFICULT

## 2024-09-17 ASSESSMENT — PAIN SCALES - GENERAL: PAINLEVEL: MODERATE PAIN (5)

## 2024-09-17 NOTE — PATIENT INSTRUCTIONS
" Goal for blood sugars is  mg/dL in am and before meals, two hours post meal goal is under 180 mg/dL.  Test three times daily in the morning before food/coffee and before lunch and supper.      2.  Watch carbohydrates 30-45 grams per meal, 15-30 grams per snack.  Can be under this but not over.      3.  Take Metformin 500 mg with breakfast and 500 mg with supper.        4.  Start Lantus insulin 12 units at bedtime.     Taking Insulin:    1. Take Lantus - 12 units at bedtime.     - Rotate injection sites, keeping at least 1 inch apart from last injection site and 2 inches away from belly button or surgical scars.      2. Pen - Use a new pen needle for each injection. Always remove pen needle from the insulin pen after use and do not store insulin pens with the needle on the pen. Do a 2 unit \"prime\" before each injection, be sure a drop or stream of insulin comes out of the needle before you give your injection. After you inject, hold the needle under the skin to the count of 10 to be sure all of the insulin goes in.      3. Store insulin you are not using in the refrigerator (do not freeze). Take new insulin out of the refrigerator a few hours prior to use to bring to room temperature.     4. Once opened Lantus can be kept at room temperature for 28 days.. Do not use the opened insulin after this time has passed, even if there is still medicine inside.     5. Always carry your blood sugar meter and a sugar source like glucose tablets with you in case of a low blood sugar (. Treat a low blood sugar (less than 70) with 15 grams of carbohydrate (1 carb choice). Wait 15 minutes, recheck blood sugar. If blood sugar is still below 70, repeat the treatment.    6. Wear Medical ID or carry a wallet card stating you have Diabetes.    7. Call your doctor or diabetes educator if you begin having low blood sugars or if you have questions or concerns.  Angela [phone].    Call Angela in one week with blood sugars: " [phone].  I work on Mondays, Tuesdays and Thursday.

## 2024-09-17 NOTE — PROGRESS NOTES
Diabetes Self-Management Education & Support    Presents for: Individual review    Type of Service: In Person Visit      ASSESSMENT:  -352 in clnic today, post two hashbrowns and some creamer with coffee (no shot (alcohol) in there today)  -went over carbohydrate counting  -went over how to use insulin, patient demonstrated pen  -went over vision changes and that they will continue to change as the numbers come down. She has appt today at eye doctor, told her to make sure they know she had recent >15% A1c so they do not give her glasses yet.  -she will start insulin and testing BG today, she will call me with numbers in one week.    Patient's most recent   Lab Results   Component Value Date    A1C >15.0 09/03/2024     is not meeting goal of <7.0    Diabetes knowledge and skills assessment:   Patient is knowledgeable in diabetes management concepts related to: new to diabetes today    Continue education with the following diabetes management concepts: Healthy Eating, Being Active, Monitoring, Taking Medication, Problem Solving, Reducing Risks, and Healthy Coping    Based on learning assessment above, most appropriate setting for further diabetes education would be: Individual setting.      PLAN  Goal for blood sugars is  mg/dL in am and before meals, two hours post meal goal is under 180 mg/dL.  Test three times daily in the morning before food/coffee and before lunch and supper.      2.  Watch carbohydrates 30-45 grams per meal, 15-30 grams per snack.  Can be under this but not over.      3.  Take Metformin 500 mg with breakfast and 500 mg with supper.        4.  Start Lantus insulin 12 units at bedtime.     Taking Insulin:    1. Take Lantus - 12 units at bedtime.     - Rotate injection sites, keeping at least 1 inch apart from last injection site and 2 inches away from belly button or surgical scars.      2. Pen - Use a new pen needle for each injection. Always remove pen needle from the insulin pen after use  "and do not store insulin pens with the needle on the pen. Do a 2 unit \"prime\" before each injection, be sure a drop or stream of insulin comes out of the needle before you give your injection. After you inject, hold the needle under the skin to the count of 10 to be sure all of the insulin goes in.      3. Store insulin you are not using in the refrigerator (do not freeze). Take new insulin out of the refrigerator a few hours prior to use to bring to room temperature.     4. Once opened Lantus can be kept at room temperature for 28 days.. Do not use the opened insulin after this time has passed, even if there is still medicine inside.     5. Always carry your blood sugar meter and a sugar source like glucose tablets with you in case of a low blood sugar (. Treat a low blood sugar (less than 70) with 15 grams of carbohydrate (1 carb choice). Wait 15 minutes, recheck blood sugar. If blood sugar is still below 70, repeat the treatment.    6. Wear Medical ID or carry a wallet card stating you have Diabetes.    7. Call your doctor or diabetes educator if you begin having low blood sugars or if you have questions or concerns.  Angela [phone].    Call Angela in one week with blood sugars: [phone].  I work on Mondays, Tuesdays and Thursday.          Follow-up: in one week via phone appointment in october    See Care Plan for co-developed, patient-state behavior change goals.  AVS provided for patient today.    Education Materials Provided:   [employer] [place] Healthy Living with Diabetes Book and Carbohydrate Counting      SUBJECTIVE/OBJECTIVE:  Presents for: Individual review  Diabetes education in the past 24mo: No  Diabetes type: Type 2  How confident are you filling out medical forms by yourself:: Quite a bit  Diabetes management related comments/concerns: how to get it gone  Cultural Influences/Ethnic Background:  Not  or       Diabetes Symptoms & Complications:  Diabetes Related Symptoms: Fatigue, " "Polydipsia (increased thirst), Polyphagia (increased hunger), Polyuria (increased urination), Visual change       Patient Problem List and Family Medical History reviewed for relevant medical history, current medical status, and diabetes risk factors.    Vitals:  LMP  (LMP Unknown)   Estimated body mass index is 27.25 kg/m  as calculated from the following:    Height as of 9/3/24: 1.575 m (5' 2\").    Weight as of 9/3/24: 67.6 kg (149 lb).   Last 3 BP:   BP Readings from Last 3 Encounters:   09/12/24 (!) 160/100   09/03/24 (!) 160/90   08/28/24 (!) 180/106       History   Smoking Status    Some Days    Types: Cigarettes   Smokeless Tobacco    Never       Labs:  Lab Results   Component Value Date    A1C >15.0 09/03/2024     Lab Results   Component Value Date     09/03/2024     09/15/2021     09/15/2021     06/07/2020     Lab Results   Component Value Date    LDL 84 09/03/2024     Direct Measure HDL   Date Value Ref Range Status   09/03/2024 43 (L) >=50 mg/dL Final   ]  GFR Estimate   Date Value Ref Range Status   09/03/2024 83 >60 mL/min/1.73m2 Final     Comment:     eGFR calculated using 2021 CKD-EPI equation.   06/24/2021 >60 >60 mL/min/1.73m2 Final   06/07/2020 81 >60 mL/min/[1.73_m2] Final     Comment:     Non  GFR Calc  Starting 12/18/2018, serum creatinine based estimated GFR (eGFR) will be   calculated using the Chronic Kidney Disease Epidemiology Collaboration   (CKD-EPI) equation.       GFR Estimate If Black   Date Value Ref Range Status   06/24/2021 >60 >60 mL/min/1.73m2 Final   06/07/2020 >90 >60 mL/min/[1.73_m2] Final     Comment:      GFR Calc  Starting 12/18/2018, serum creatinine based estimated GFR (eGFR) will be   calculated using the Chronic Kidney Disease Epidemiology Collaboration   (CKD-EPI) equation.       Lab Results   Component Value Date    CR 0.80 09/03/2024    CR 0.80 06/07/2020     No results found for: \"MICROALBUMIN\"    Healthy " Eating:  Healthy Eating Assessed Today: Yes  Cultural/Orthodox diet restrictions?: No  How many times a week on average do you eat food made away from home (restaurant/take-out)?: 2  Meals include: Dinner, Morning Snack  Breakfast: coffee, two hashbrown 15 minutes ago.  BG in clinic:  372  Lunch: skip or burger josé: 5 dollar deal: 4 nuggets, small warren, sweet tea and whopper jr or sandwich from home  Dinner: steak, or pasta meals, food shelf stuff spagettios cold  Snacks: crackers, cheese  Other: alcohol: shot in am in her coffee, a drink at night lasts her a couple hours.  used to drink a lot.  Beverages: Water, Tea, Coffee, Juice, Energy drinks, Alcohol    Being Active:  Being Active Assessed Today: Yes (cleaning out her house.  goes to the  in [place].)  Barrier to exercise: Physical limitation    Monitoring:  Blood Glucose Meter: Unknown  Times checking blood sugar at home (number): Other (see Comments)  Please elaborate:: havent recieved the ashley yet get it today    352 in clinic today    Taking Medications:  Diabetes Medication(s)       Biguanides       metFORMIN (GLUCOPHAGE XR) 500 MG 24 hr tablet Take 1 tablet (500 mg) by mouth 2 times daily (with meals).       Insulin       insulin glargine (LANTUS PEN) 100 UNIT/ML pen Inject 12 Units subcutaneously at bedtime.            Current Treatments: Diet, Insulin Injections, Oral Medication (taken by mouth)    Problem Solving:                 Reducing Risks:  Has dilated eye exam at least once a year?: No  Sees dentist every 6 months?: No  Feet checked by healthcare provider in the last year?: No    Healthy Coping:  Informal Support system:: Children, Neighbors  Patient Activation Measure Survey Score:       No data to display                  Care Plan and Education Provided:  There are no care plans that you recently modified to display for this patient.          Time Spent: 60 minutes  Encounter Type: Individual    Any diabetes medication dose changes  were made via the CDE Protocol per the patient's primary care provider. A copy of this encounter was shared with the provider.

## 2024-09-17 NOTE — NURSING NOTE
"Chief Complaint   Patient presents with    Diabetes    Hypertension       Initial BP (!) 170/100   Pulse 72   Temp 97.2  F (36.2  C) (Tympanic)   Resp 20   Ht 1.575 m (5' 2\")   Wt 67.6 kg (149 lb)   LMP  (LMP Unknown)   SpO2 99%   BMI 27.25 kg/m   Estimated body mass index is 27.25 kg/m  as calculated from the following:    Height as of this encounter: 1.575 m (5' 2\").    Weight as of this encounter: 67.6 kg (149 lb).    Patient presents to the clinic using No DME    Is there anyone who you would like to be able to receive your results? No  If yes have patient fill out MARGY      "

## 2024-09-17 NOTE — PROGRESS NOTES
Assessment & Plan     Essential hypertension  Not controlled  Switch from losartan to losartan hydrochlorothiazide  Follow up in 1 month  - Med Therapy Management Referral  - losartan-hydrochlorothiazide (HYZAAR) 50-12.5 MG tablet; Take 1 tablet by mouth daily.    Type 2 diabetes mellitus with hyperglycemia, without long-term current use of insulin (H)  Recently started lantus  Symptoms of polydipsia and polyuria mildly improved  - Med Therapy Management Referral  - metFORMIN (GLUCOPHAGE XR) 500 MG 24 hr tablet; Take 1 tablet (500 mg) by mouth 2 times daily (with meals).  - FOOT EXAM  - Comprehensive metabolic panel (BMP + Alb, Alk Phos, ALT, AST, Total. Bili, TP); Future  - CBC with platelets; Future    Visit for screening mammogram  scheduled    Medication management  - Med Therapy Management Referral    Pelvic pain in female  Right  Past 3 weeks  No N/V, pain out of proportion, rebound tenderness  Already has a ct AP ordered for abdominal mass - recommend this be scheduled  No previous history of ovarian torsion nor cysts    Vaginal discharge  Improving but still making her uncomfortable  Previous yeast infection  - Wet prep - Clinic Collect    Coronary artery disease involving native heart without angina pectoris, unspecified vessel or lesion type  Not on statin - patient with multiple concerns  Plan on starting lipitor or crestor    Current severe episode of major depressive disorder without psychotic features without prior episode (H)  Stable - wants to follow with psychiatry, I am ok with this due to her psych history    Chronic hepatitis C without hepatic coma (H)  Lab for monitoring  - Hepatitis C Screen Reflex to HCV RNA Quant and Genotype; Future    Antisocial personality disorder (H)  As above    The longitudinal plan of care for the diagnosis(es)/condition(s) as documented were addressed during this visit. Due to the added complexity in care, I will continue to support Vitaly in the subsequent  "management and with ongoing continuity of care.       BMI  Estimated body mass index is 27.25 kg/m  as calculated from the following:    Height as of this encounter: 1.575 m (5' 2\").    Weight as of this encounter: 67.6 kg (149 lb).     Blood sugar testing frequency justification:  Uncontrolled diabetes, Adjustment of medication(s), and Risk of hypoglycemia with medication(s)        Adam Haider is a 63 year old, presenting for the following health issues:  Diabetes and Hypertension        9/17/2024     7:58 AM   Additional Questions   Roomed by Reba FOSTER   Accompanied by self     History of Present Illness       Diabetes:   She presents for follow up of diabetes.  She is checking home blood glucose three times daily.   She checks blood glucose before meals.  Blood glucose is sometimes over 200 and never under 70. She is aware of hypoglycemia symptoms including shakiness, dizziness, weakness and blurred vision.   She is concerned about blood sugar frequently over 200.   She is having blurry vision and weight loss.  The patient has not had a diabetic eye exam in the last 12 months.          Hypertension: She presents for follow up of hypertension.  She does not check blood pressure  regularly outside of the clinic. Outpatient blood pressures have not been over 140/90. She does not follow a low salt diet.     She eats 0-1 servings of fruits and vegetables daily.She consumes 3 sweetened beverage(s) daily.She exercises with enough effort to increase her heart rate 30 to 60 minutes per day.  She exercises with enough effort to increase her heart rate 6 days per week.   She is taking medications regularly.         Concern - Right lower quad pain  Onset: 3 weeks  Description: low right quad pain  Intensity: Morning worse  Progression of Symptoms:  same  Accompanying Signs & Symptoms: pain  Previous history of similar problem: no  Precipitating factors:        Worsened by:   Alleviating factors:        Improved by: " "  Therapies tried and outcome:  none             Objective    BP (!) 170/100   Pulse 72   Temp 97.2  F (36.2  C) (Tympanic)   Resp 20   Ht 1.575 m (5' 2\")   Wt 67.6 kg (149 lb)   LMP  (LMP Unknown)   SpO2 99%   BMI 27.25 kg/m    Body mass index is 27.25 kg/m .  Physical Exam   GENERAL: alert and no distress  NECK: no adenopathy, no asymmetry, masses, or scars  RESP: lungs clear to auscultation - no rales, rhonchi or wheezes  CV: regular rate and rhythm, normal S1 S2, no S3 or S4, no murmur, click or rub, no peripheral edema  ABDOMEN: tenderness RLQ and bowel sounds normal  MS: no gross musculoskeletal defects noted, no edema            Signed Electronically by: Cathy Silva MD    "

## 2024-09-17 NOTE — LETTER
9/17/2024         RE: Vitaly Roach  [address]  [facility] [facility] [id number]        Dear Colleague,    Thank you for referring your patient, Vitaly Roach, to the [facility]. Please see a copy of my visit note below.    Diabetes Self-Management Education & Support    Presents for: Individual review    Type of Service: In Person Visit      ASSESSMENT:  -352 in clnic today, post two hashbrowns and some creamer with coffee (no shot (alcohol) in there today)  -went over carbohydrate counting  -went over how to use insulin, patient demonstrated pen  -went over vision changes and that they will continue to change as the numbers come down. She has appt today at eye doctor, told her to make sure they know she had recent >15% A1c so they do not give her glasses yet.  -she will start insulin and testing BG today, she will call me with numbers in one week.    Patient's most recent   Lab Results   Component Value Date    A1C >15.0 09/03/2024     is not meeting goal of <7.0    Diabetes knowledge and skills assessment:   Patient is knowledgeable in diabetes management concepts related to: new to diabetes today    Continue education with the following diabetes management concepts: Healthy Eating, Being Active, Monitoring, Taking Medication, Problem Solving, Reducing Risks, and Healthy Coping    Based on learning assessment above, most appropriate setting for further diabetes education would be: Individual setting.      PLAN  Goal for blood sugars is  mg/dL in am and before meals, two hours post meal goal is under 180 mg/dL.  Test three times daily in the morning before food/coffee and before lunch and supper.      2.  Watch carbohydrates 30-45 grams per meal, 15-30 grams per snack.  Can be under this but not over.      3.  Take Metformin 500 mg with breakfast and 500 mg with supper.        4.  Start Lantus insulin 12 units at bedtime.     Taking Insulin:    1. Take Lantus - 12 units  "at bedtime.     - Rotate injection sites, keeping at least 1 inch apart from last injection site and 2 inches away from belly button or surgical scars.      2. Pen - Use a new pen needle for each injection. Always remove pen needle from the insulin pen after use and do not store insulin pens with the needle on the pen. Do a 2 unit \"prime\" before each injection, be sure a drop or stream of insulin comes out of the needle before you give your injection. After you inject, hold the needle under the skin to the count of 10 to be sure all of the insulin goes in.      3. Store insulin you are not using in the refrigerator (do not freeze). Take new insulin out of the refrigerator a few hours prior to use to bring to room temperature.     4. Once opened Lantus can be kept at room temperature for 28 days.. Do not use the opened insulin after this time has passed, even if there is still medicine inside.     5. Always carry your blood sugar meter and a sugar source like glucose tablets with you in case of a low blood sugar (. Treat a low blood sugar (less than 70) with 15 grams of carbohydrate (1 carb choice). Wait 15 minutes, recheck blood sugar. If blood sugar is still below 70, repeat the treatment.    6. Wear Medical ID or carry a wallet card stating you have Diabetes.    7. Call your doctor or diabetes educator if you begin having low blood sugars or if you have questions or concerns.  Angela [phone].    Call Angela in one week with blood sugars: [phone].  I work on Mondays, Tuesdays and Thursday.          Follow-up: in one week via phone appointment in october    See Care Plan for co-developed, patient-state behavior change goals.  AVS provided for patient today.    Education Materials Provided:  [employer] Healthy Living with Diabetes Book and Carbohydrate Counting      SUBJECTIVE/OBJECTIVE:  Presents for: Individual review  Diabetes education in the past 24mo: No  Diabetes type: Type 2  How confident are you " "filling out medical forms by yourself:: Quite a bit  Diabetes management related comments/concerns: how to get it gone  Cultural Influences/Ethnic Background:  Not  or       Diabetes Symptoms & Complications:  Diabetes Related Symptoms: Fatigue, Polydipsia (increased thirst), Polyphagia (increased hunger), Polyuria (increased urination), Visual change       Patient Problem List and Family Medical History reviewed for relevant medical history, current medical status, and diabetes risk factors.    Vitals:  LMP  (LMP Unknown)   Estimated body mass index is 27.25 kg/m  as calculated from the following:    Height as of 9/3/24: 1.575 m (5' 2\").    Weight as of 9/3/24: 67.6 kg (149 lb).   Last 3 BP:   BP Readings from Last 3 Encounters:   09/12/24 (!) 160/100   09/03/24 (!) 160/90   08/28/24 (!) 180/106       History   Smoking Status     Some Days     Types: Cigarettes   Smokeless Tobacco     Never       Labs:  Lab Results   Component Value Date    A1C >15.0 09/03/2024     Lab Results   Component Value Date     09/03/2024     09/15/2021     09/15/2021     06/07/2020     Lab Results   Component Value Date    LDL 84 09/03/2024     Direct Measure HDL   Date Value Ref Range Status   09/03/2024 43 (L) >=50 mg/dL Final   ]  GFR Estimate   Date Value Ref Range Status   09/03/2024 83 >60 mL/min/1.73m2 Final     Comment:     eGFR calculated using 2021 CKD-EPI equation.   06/24/2021 >60 >60 mL/min/1.73m2 Final   06/07/2020 81 >60 mL/min/[1.73_m2] Final     Comment:     Non  GFR Calc  Starting 12/18/2018, serum creatinine based estimated GFR (eGFR) will be   calculated using the Chronic Kidney Disease Epidemiology Collaboration   (CKD-EPI) equation.       GFR Estimate If Black   Date Value Ref Range Status   06/24/2021 >60 >60 mL/min/1.73m2 Final   06/07/2020 >90 >60 mL/min/[1.73_m2] Final     Comment:      GFR Calc  Starting 12/18/2018, serum creatinine based " "estimated GFR (eGFR) will be   calculated using the Chronic Kidney Disease Epidemiology Collaboration   (CKD-EPI) equation.       Lab Results   Component Value Date    CR 0.80 2024    CR 0.80 2020     No results found for: \"MICROALBUMIN\"    Healthy Eating:  Healthy Eating Assessed Today: Yes  Cultural/Congregational diet restrictions?: No  How many times a week on average do you eat food made away from home (restaurant/take-out)?: 2  Meals include: Dinner, Morning Snack  Breakfast: coffee, two hashbrown 15 minutes ago.  BG in clinic:  372  Lunch: skip or burger josé: 5 dollar deal: 4 nuggets, small warren, sweet tea and whopper jr or sandwich from home  Dinner: steak, or pasta meals, food shelf stuff spagettios cold  Snacks: crackers, cheese  Other: alcohol: shot in am in her coffee, a drink at night lasts her a couple hours.  used to drink a lot.  Beverages: Water, Tea, Coffee, Juice, Energy drinks, Alcohol    Being Active:  Being Active Assessed Today: Yes (cleaning out her house.  goes to the  in [place].)  Barrier to exercise: Physical limitation    Monitoring:  Blood Glucose Meter: Unknown  Times checking blood sugar at home (number): Other (see Comments)  Please elaborate:: havent recieved the ashley yet get it today    352 in clinic today    Taking Medications:  Diabetes Medication(s)       Biguanides       metFORMIN (GLUCOPHAGE XR) 500 MG 24 hr tablet Take 1 tablet (500 mg) by mouth 2 times daily (with meals).       Insulin       insulin glargine (LANTUS PEN) 100 UNIT/ML pen Inject 12 Units subcutaneously at bedtime.            Current Treatments: Diet, Insulin Injections, Oral Medication (taken by mouth)    Problem Solving:                 Reducing Risks:  Has dilated eye exam at least once a year?: No  Sees dentist every 6 months?: No  Feet checked by healthcare provider in the last year?: No    Healthy Coping:  Informal Support system:: Children, Neighbors  Patient Activation Measure Survey " Score:       No data to display                  Care Plan and Education Provided:  There are no care plans that you recently modified to display for this patient.          Time Spent: 60 minutes  Encounter Type: Individual    Any diabetes medication dose changes were made via the CDE Protocol per the patient's primary care provider. A copy of this encounter was shared with the provider.

## 2024-09-17 NOTE — ADDENDUM NOTE
Addended by: ISIDRO RAZA on: 9/17/2024 10:53 AM     Modules accepted: Orders     Alert and oriented to person, place and time

## 2024-09-18 ENCOUNTER — TELEPHONE (OUTPATIENT)
Dept: FAMILY MEDICINE | Facility: CLINIC | Age: 64
End: 2024-09-18
Payer: COMMERCIAL

## 2024-09-18 DIAGNOSIS — E11.65 TYPE 2 DIABETES MELLITUS WITH HYPERGLYCEMIA, WITHOUT LONG-TERM CURRENT USE OF INSULIN (H): Primary | ICD-10-CM

## 2024-09-18 DIAGNOSIS — I10 ESSENTIAL HYPERTENSION: ICD-10-CM

## 2024-09-18 DIAGNOSIS — F60.2 ANTISOCIAL PERSONALITY DISORDER (H): ICD-10-CM

## 2024-09-18 DIAGNOSIS — I25.10 CORONARY ARTERY DISEASE INVOLVING NATIVE HEART WITHOUT ANGINA PECTORIS, UNSPECIFIED VESSEL OR LESION TYPE: ICD-10-CM

## 2024-09-18 NOTE — TELEPHONE ENCOUNTER
Dr. Silva what do you think about Care Coordination for Vitaly?    She want's to see Psychiatrist but states needs help with setting up appointment.  Complaint difficulty finding dental care, wants new dentures.     Reba Jaquez,CMA

## 2024-09-19 ENCOUNTER — TELEPHONE (OUTPATIENT)
Dept: FAMILY MEDICINE | Facility: CLINIC | Age: 64
End: 2024-09-19
Payer: COMMERCIAL

## 2024-09-19 LAB — HCV RNA SERPL NAA+PROBE-ACNC: NOT DETECTED IU/ML

## 2024-09-19 NOTE — TELEPHONE ENCOUNTER
MTM referral from: [facility] visit (referral by provider)    MTM referral outreach attempt #2 on September 19, 2024 at 11:12 AM      Outcome: Patient not reachable after several attempts, sent [employer] message    Use   [facility] med adv    for the carrier/Plan on the flowsheet      MyChart Message Sent    ÁNGELA Garcia   [phone]

## 2024-09-23 ENCOUNTER — PATIENT OUTREACH (OUTPATIENT)
Dept: CARE COORDINATION | Facility: CLINIC | Age: 64
End: 2024-09-23
Payer: COMMERCIAL

## 2024-09-23 NOTE — PROGRESS NOTES
Clinic Care Coordination Contact  [facility]/Voicemail    Clinical Data: Care Coordinator Outreach    Outreach Documentation Number of Outreach Attempt   9/23/2024   9:52 AM 1       Left message on patient's voicemail with call back information and requested return call.    Plan: Care Coordinator will try to reach patient again in 1-2 business days.    DEAN Altamirano  [phone]  [facility]

## 2024-09-24 NOTE — PROGRESS NOTES
Clinic Care Coordination Contact  [facility]/Voicemail    Clinical Data: Care Coordinator Outreach    Outreach Documentation Number of Outreach Attempt   9/23/2024   9:52 AM 1   9/24/2024  10:34 AM 2       Left message on patient's voicemail with call back information and requested return call.    Plan: Care Coordinator will send care coordination introduction letter with care coordinator contact information and explanation of care coordination services via [employer]. Care Coordinator will do no further outreaches at this time.    Berta Miranda, DEAN  [phone]  [facility]

## 2024-09-25 ENCOUNTER — ALLIED HEALTH/NURSE VISIT (OUTPATIENT)
Dept: FAMILY MEDICINE | Facility: CLINIC | Age: 64
End: 2024-09-25
Payer: COMMERCIAL

## 2024-09-25 VITALS — DIASTOLIC BLOOD PRESSURE: 96 MMHG | HEART RATE: 88 BPM | SYSTOLIC BLOOD PRESSURE: 154 MMHG

## 2024-09-25 DIAGNOSIS — I10 ESSENTIAL HYPERTENSION: Primary | ICD-10-CM

## 2024-09-25 PROCEDURE — 99207 PR NO CHARGE NURSE ONLY: CPT

## 2024-09-25 NOTE — PROGRESS NOTES
"Vitaly Roach is a 63 year old year old patient who [facility] because of medication change. She did not have her blood pressure checked there. She states she took both losartan and losartan-hydrochlorothiazide \"just one time, this morning\"   Vital Signs as repeated by /104 P 88, 152/100, and 154/96  Patient is taking medication as prescribed bedsides forgetting a couple of times  Patient is tolerating medications well.  Patient is not monitoring Blood Pressure at home.    Current complaints: none  Disposition:  huddled with provider. Per Dr Silva, patient to take only the losartan-hydrochlorothiazide 50-12.5 mg daily. Scheduled for nurse only visit on 09/27/24 for blood glucose teaching. Written instruction given to bring her meter and supplies to the appt. Patient verbalized understanding. When reminded about MTM referral, patient stated she already talked to them. She verbalized having a lot of stress in her life right now, \"a lot of things breaking down on me\", her phone being one.   BP Readings from Last 6 Encounters:   09/25/24 (!) 168/104   09/17/24 (!) 176/102   09/12/24 (!) 160/100   09/03/24 (!) 160/90   08/28/24 (!) 180/106   09/15/21 (!) 149/76     Urvashi VALDEZ RN       "

## 2024-09-27 ENCOUNTER — HOSPITAL ENCOUNTER (OUTPATIENT)
Dept: CT IMAGING | Facility: CLINIC | Age: 64
Discharge: HOME | End: 2024-09-27
Attending: FAMILY MEDICINE | Admitting: FAMILY MEDICINE
Payer: COMMERCIAL

## 2024-09-27 DIAGNOSIS — R19.05 PERIUMBILICAL MASS: ICD-10-CM

## 2024-09-27 PROCEDURE — 74176 CT ABD & PELVIS W/O CONTRAST: CPT

## 2024-10-12 ENCOUNTER — HEALTH MAINTENANCE LETTER (OUTPATIENT)
Age: 64
End: 2024-10-12

## 2024-10-14 ENCOUNTER — TELEPHONE (OUTPATIENT)
Dept: EDUCATION SERVICES | Facility: CLINIC | Age: 64
End: 2024-10-14

## 2024-10-14 NOTE — TELEPHONE ENCOUNTER
Called [phone], other phone number no longer working. Attempted twice, left voicemail explaining there are no acute concerns but would like to go over th findings - could be via telephone visit.     There is small supra umbilical hernia - nothing to do  There is known liver cirrhosis    Agree with insulin adjustment as below    Please help reschedule her appointment with me as well

## 2024-10-14 NOTE — TELEPHONE ENCOUNTER
Vitaly Jay missed appointment with both of us today.  She is still running higher numbers see note below.  Are you ok with the insulin adjustment below as patient is still running very high numbers?  She also would like a call from someone to get her CT results, Phone number that I called today:   [phone] will work for the next couple days then will change to: [phone].    Thanks! VICKIE Angelo RD    Diabetes education contact:    Called patient since she missed her appointment.  She is still running higher numbers 367 and up.  Increased her lantus from 12 units to 18 units.  She is still struggling with bladder control due to high blood sugars. She has made lots of dietary changes but I explained to her she still needs more insulin to help get numbers down.    We rescheduled her appointment to November 5th at 11 am.    Angela Reis RD, VICKIE

## 2024-10-15 NOTE — TELEPHONE ENCOUNTER
I also tried [phone] and this number is no longer in service.  The second number [phone] is also not a working number.

## 2024-10-16 NOTE — TELEPHONE ENCOUNTER
Called [phone] number. Left message on unidentified VM to call clinic back.    Nuha Goodrich Patient

## 2024-10-17 NOTE — TELEPHONE ENCOUNTER
Both phone #'s are not working on patient chart, tried to reach out to son, phone not accepting calls.... called dil and left message for patient to call clinic.     Scheduled visit with Dr Silva before visit with Yamel

## 2024-10-20 ENCOUNTER — TRANSFERRED RECORDS (OUTPATIENT)
Dept: MULTI SPECIALTY CLINIC | Facility: CLINIC | Age: 64
End: 2024-10-20
Payer: COMMERCIAL

## 2024-10-20 LAB — RETINOPATHY: NORMAL

## 2024-10-21 ENCOUNTER — OFFICE VISIT (OUTPATIENT)
Dept: URGENT CARE | Facility: URGENT CARE | Age: 64
End: 2024-10-21
Payer: COMMERCIAL

## 2024-10-21 VITALS
WEIGHT: 151 LBS | RESPIRATION RATE: 18 BRPM | TEMPERATURE: 97.7 F | BODY MASS INDEX: 27.62 KG/M2 | HEART RATE: 83 BPM | OXYGEN SATURATION: 96 % | DIASTOLIC BLOOD PRESSURE: 102 MMHG | SYSTOLIC BLOOD PRESSURE: 147 MMHG

## 2024-10-21 DIAGNOSIS — Z20.2 POSSIBLE EXPOSURE TO STD: ICD-10-CM

## 2024-10-21 DIAGNOSIS — N76.0 BACTERIAL VAGINOSIS: Primary | ICD-10-CM

## 2024-10-21 DIAGNOSIS — B37.31 YEAST INFECTION OF THE VAGINA: ICD-10-CM

## 2024-10-21 DIAGNOSIS — B96.89 BACTERIAL VAGINOSIS: Primary | ICD-10-CM

## 2024-10-21 LAB
CLUE CELLS: PRESENT
TRICHOMONAS, WET PREP: ABNORMAL
WBC'S/HIGH POWER FIELD, WET PREP: ABNORMAL
YEAST, WET PREP: PRESENT

## 2024-10-21 PROCEDURE — 87210 SMEAR WET MOUNT SALINE/INK: CPT | Performed by: NURSE PRACTITIONER

## 2024-10-21 PROCEDURE — 87491 CHLMYD TRACH DNA AMP PROBE: CPT | Performed by: NURSE PRACTITIONER

## 2024-10-21 PROCEDURE — 87591 N.GONORRHOEAE DNA AMP PROB: CPT | Performed by: NURSE PRACTITIONER

## 2024-10-21 PROCEDURE — 99214 OFFICE O/P EST MOD 30 MIN: CPT | Performed by: NURSE PRACTITIONER

## 2024-10-21 RX ORDER — METRONIDAZOLE 500 MG/1
500 TABLET ORAL 2 TIMES DAILY
Qty: 14 TABLET | Refills: 0 | Status: SHIPPED | OUTPATIENT
Start: 2024-10-21 | End: 2024-10-28

## 2024-10-21 RX ORDER — FLUCONAZOLE 150 MG/1
150 TABLET ORAL
Qty: 3 TABLET | Refills: 0 | Status: SHIPPED | OUTPATIENT
Start: 2024-10-21 | End: 2024-10-28

## 2024-10-22 LAB
C TRACH DNA SPEC QL NAA+PROBE: NEGATIVE
N GONORRHOEA DNA SPEC QL NAA+PROBE: NEGATIVE

## 2024-10-22 NOTE — PATIENT INSTRUCTIONS
You currently have a vaginal infection called bacterial vaginosis, BV for short.   This is not a sexually transmitted infection and it cannot be transmitted to your partner.  BV typically occurs due to a change in pH balance of the vagina. The following things may cause BV to occur: douching, new soaps or lubricants, or oral sex. Sometimes we can't prevent BV because it can happen for no reason at all.   Sometimes BV is asymptomatic, but classically it causes thin or creamy odorous vaginal discharge. It can also cause some low abdominal discomfort.   Today we will treat this infection with a antibiotic called Metronidazole (Flagyl). Take this medication two times a day for 7 days. You must avoid drinking alcohol while you are taking this medication. If you drink while taking Flagyl you may experience severe headache, nausea/vomiting, or liver dysfunction.  Follow up if you continue to have symptoms after you have completed your treatment.   ____________________  You currently have a vaginal yeast infection.   This is not a sexually transmitted infection and it cannot be transmitted to your partner.  Today we will treat this infection with a one time pill called Diflucan. Take one today, may repeat in 3 days if symptoms persist.   Follow up if you continue to have symptoms after you have completed your treatment.

## 2024-10-22 NOTE — PROGRESS NOTES
SUBJECTIVE:   Vitaly Roahc is a 63 year old female presenting with a chief complaint of   Chief Complaint   Patient presents with    STD     Was told she was with someone that had chlamydia.       Past Medical History:   Diagnosis Date    Bipolar 1 disorder (H)     Chronic back pain     Diabetes mellitus (H)     Hepatitis C     Hepatitis C 2003    Hypertension     Substance abuse (H)      Family History   Problem Relation Age of Onset    Fibromyalgia Mother     Heart Disease Father     Breast Cancer Maternal Grandmother      Current Outpatient Medications   Medication Sig Dispense Refill    fluconazole (DIFLUCAN) 150 MG tablet Take 1 tablet (150 mg) by mouth every 3 days for 3 doses. 3 tablet 0    insulin glargine (LANTUS PEN) 100 UNIT/ML pen Inject 12 Units subcutaneously at bedtime. 15 mL 0    losartan-hydrochlorothiazide (HYZAAR) 50-12.5 MG tablet Take 1 tablet by mouth daily. 60 tablet 0    metFORMIN (GLUCOPHAGE XR) 500 MG 24 hr tablet Take 1 tablet (500 mg) by mouth 2 times daily (with meals). 60 tablet 0    metroNIDAZOLE (FLAGYL) 500 MG tablet Take 1 tablet (500 mg) by mouth 2 times daily for 7 days. 14 tablet 0    blood glucose (NO BRAND SPECIFIED) lancets standard Use to test blood sugar 3 times daily 300 each 0    blood glucose (NO BRAND SPECIFIED) test strip Use to test blood sugar 3 times daily 300 strip 0    blood glucose monitoring (NO BRAND SPECIFIED) meter device kit Use to test blood sugar 3 times daily or as directed. 1 kit 0    Continuous Glucose Sensor (FREESTYLE LILLY 3 PLUS SENSOR) MISC Use 1 sensor every 15 days. Use to read blood sugars per 's instructions. 2 each 5    gabapentin (NEURONTIN) 300 MG capsule Take 1 capsule (300 mg) by mouth 3 times daily for 10 days 30 capsule 0    insulin pen needle (32G X 4 MM) 32G X 4 MM miscellaneous Use 1 pen needles daily or as directed. 100 each 0    losartan (COZAAR) 25 MG tablet Take 1 tablet (25 mg) by mouth daily. (Patient not taking:  Reported on 10/21/2024) 90 tablet 0    valACYclovir (VALTREX) 1000 mg tablet Take 1 tablet (1,000 mg) by mouth 3 times daily for 7 days 21 tablet 0     Social History     Tobacco Use    Smoking status: Some Days     Types: Cigarettes    Smokeless tobacco: Never   Substance Use Topics    Alcohol use: No       OBJECTIVE  BP (!) 147/102   Pulse 83   Temp 97.7  F (36.5  C) (Tympanic)   Resp 18   Wt 68.5 kg (151 lb)   LMP  (LMP Unknown)   SpO2 96%   BMI 27.62 kg/m      Physical Exam  Constitutional:       Appearance: Normal appearance.   Pulmonary:      Effort: Pulmonary effort is normal.   Neurological:      Mental Status: She is alert.   Psychiatric:         Mood and Affect: Mood normal.       Recent Results (from the past 168 hour(s))   Wet preparation    Specimen: Vagina; Swab   Result Value Ref Range Status    Trichomonas Absent Absent Final    Yeast Present (A) Absent Final    Clue Cells Present (A) Absent Final    WBCs/high power field 3+ (A) None Final     Gonorrhea: pending  Chlamydia: pending      ASSESSMENT:  1. Bacterial vaginosis    - metroNIDAZOLE (FLAGYL) 500 MG tablet; Take 1 tablet (500 mg) by mouth 2 times daily for 7 days.  Dispense: 14 tablet; Refill: 0    2. Yeast infection of the vagina    - fluconazole (DIFLUCAN) 150 MG tablet; Take 1 tablet (150 mg) by mouth every 3 days for 3 doses.  Dispense: 3 tablet; Refill: 0    3. Possible exposure to STD    - Chlamydia trachomatis PCR  - Neisseria gonorrhoeae PCR  - Wet preparation        PLAN:  You currently have a vaginal infection called bacterial vaginosis, BV for short.   This is not a sexually transmitted infection and it cannot be transmitted to your partner.  BV typically occurs due to a change in pH balance of the vagina. The following things may cause BV to occur: douching, new soaps or lubricants, or oral sex. Sometimes we can't prevent BV because it can happen for no reason at all.   Sometimes BV is asymptomatic, but classically it causes  thin or creamy odorous vaginal discharge. It can also cause some low abdominal discomfort.   Today we will treat this infection with a antibiotic called Metronidazole (Flagyl). Take this medication two times a day for 7 days. You must avoid drinking alcohol while you are taking this medication. If you drink while taking Flagyl you may experience severe headache, nausea/vomiting, or liver dysfunction.  Follow up if you continue to have symptoms after you have completed your treatment.   ____________________  You currently have a vaginal yeast infection.   This is not a sexually transmitted infection and it cannot be transmitted to your partner.  Today we will treat this infection with a one time pill called Diflucan. Take one today, may repeat in 3 days if symptoms persist.   Follow up if you continue to have symptoms after you have completed your treatment.

## 2024-11-05 ENCOUNTER — TELEPHONE (OUTPATIENT)
Dept: FAMILY MEDICINE | Facility: CLINIC | Age: 64
End: 2024-11-05

## 2024-11-05 NOTE — TELEPHONE ENCOUNTER
Patient Quality Outreach    Patient is due for the following:   Diabetes -  Eye Exam and BP Check  Hypertension -  BP check    Next Steps:   Schedule a office visit for Blood pressure check.    Type of outreach:    Sent [employer] message.      Questions for provider review:    None           Reba Jaquez CMA

## 2024-11-06 ENCOUNTER — ANCILLARY PROCEDURE (OUTPATIENT)
Dept: MAMMOGRAPHY | Facility: CLINIC | Age: 64
End: 2024-11-06
Payer: COMMERCIAL

## 2024-11-06 DIAGNOSIS — E11.65 TYPE 2 DIABETES MELLITUS WITH HYPERGLYCEMIA, WITHOUT LONG-TERM CURRENT USE OF INSULIN (H): ICD-10-CM

## 2024-11-06 DIAGNOSIS — Z12.31 VISIT FOR SCREENING MAMMOGRAM: ICD-10-CM

## 2024-11-06 DIAGNOSIS — I10 ESSENTIAL HYPERTENSION: ICD-10-CM

## 2024-11-06 PROCEDURE — 77063 BREAST TOMOSYNTHESIS BI: CPT | Mod: TC | Performed by: RADIOLOGY

## 2024-11-06 PROCEDURE — 77067 SCR MAMMO BI INCL CAD: CPT | Mod: TC | Performed by: RADIOLOGY

## 2024-11-07 RX ORDER — LOSARTAN POTASSIUM AND HYDROCHLOROTHIAZIDE 12.5; 5 MG/1; MG/1
1 TABLET ORAL DAILY
Qty: 30 TABLET | Refills: 0 | Status: SHIPPED | OUTPATIENT
Start: 2024-11-07

## 2024-11-07 RX ORDER — INSULIN GLARGINE 100 [IU]/ML
INJECTION, SOLUTION SUBCUTANEOUS
Qty: 15 ML | Refills: 0 | Status: SHIPPED | OUTPATIENT
Start: 2024-11-07

## 2024-11-07 RX ORDER — PEN NEEDLE, DIABETIC 32GX 5/32"
NEEDLE, DISPOSABLE MISCELLANEOUS
Qty: 100 EACH | Refills: 0 | Status: SHIPPED | OUTPATIENT
Start: 2024-11-07

## 2024-11-10 DIAGNOSIS — I10 ESSENTIAL HYPERTENSION: ICD-10-CM

## 2024-11-11 RX ORDER — LOSARTAN POTASSIUM AND HYDROCHLOROTHIAZIDE 12.5; 5 MG/1; MG/1
1 TABLET ORAL DAILY
Qty: 30 TABLET | Refills: 0 | OUTPATIENT
Start: 2024-11-11

## 2024-11-15 ENCOUNTER — OFFICE VISIT (OUTPATIENT)
Dept: FAMILY MEDICINE | Facility: CLINIC | Age: 64
End: 2024-11-15
Payer: COMMERCIAL

## 2024-11-15 VITALS
HEART RATE: 84 BPM | BODY MASS INDEX: 27.05 KG/M2 | HEIGHT: 62 IN | WEIGHT: 147 LBS | DIASTOLIC BLOOD PRESSURE: 90 MMHG | TEMPERATURE: 98.7 F | SYSTOLIC BLOOD PRESSURE: 130 MMHG | OXYGEN SATURATION: 98 % | RESPIRATION RATE: 20 BRPM

## 2024-11-15 DIAGNOSIS — Z79.899 MEDICATION MANAGEMENT: Primary | ICD-10-CM

## 2024-11-15 DIAGNOSIS — F10.11 HISTORY OF ALCOHOL ABUSE: ICD-10-CM

## 2024-11-15 DIAGNOSIS — F19.11 HISTORY OF DRUG ABUSE (H): ICD-10-CM

## 2024-11-15 DIAGNOSIS — E11.65 TYPE 2 DIABETES MELLITUS WITH HYPERGLYCEMIA, WITHOUT LONG-TERM CURRENT USE OF INSULIN (H): ICD-10-CM

## 2024-11-15 DIAGNOSIS — G89.29 OTHER CHRONIC PAIN: ICD-10-CM

## 2024-11-15 PROCEDURE — G2211 COMPLEX E/M VISIT ADD ON: HCPCS | Performed by: FAMILY MEDICINE

## 2024-11-15 PROCEDURE — 99214 OFFICE O/P EST MOD 30 MIN: CPT | Performed by: FAMILY MEDICINE

## 2024-11-15 RX ORDER — GABAPENTIN 300 MG/1
300 CAPSULE ORAL 3 TIMES DAILY
Qty: 90 CAPSULE | Refills: 0 | Status: SHIPPED | OUTPATIENT
Start: 2024-11-15

## 2024-11-15 RX ORDER — METFORMIN HYDROCHLORIDE 500 MG/1
1000 TABLET, EXTENDED RELEASE ORAL 2 TIMES DAILY WITH MEALS
Qty: 360 TABLET | Refills: 3 | Status: SHIPPED | OUTPATIENT
Start: 2024-11-15 | End: 2025-02-13

## 2024-11-15 RX ORDER — HYDROCHLOROTHIAZIDE 12.5 MG/1
CAPSULE ORAL
Qty: 2 EACH | Refills: 5 | Status: SHIPPED | OUTPATIENT
Start: 2024-11-15

## 2024-11-15 ASSESSMENT — PATIENT HEALTH QUESTIONNAIRE - PHQ9
10. IF YOU CHECKED OFF ANY PROBLEMS, HOW DIFFICULT HAVE THESE PROBLEMS MADE IT FOR YOU TO DO YOUR WORK, TAKE CARE OF THINGS AT HOME, OR GET ALONG WITH OTHER PEOPLE: SOMEWHAT DIFFICULT
SUM OF ALL RESPONSES TO PHQ QUESTIONS 1-9: 7
SUM OF ALL RESPONSES TO PHQ QUESTIONS 1-9: 7

## 2024-11-15 ASSESSMENT — PAIN SCALES - GENERAL: PAINLEVEL_OUTOF10: SEVERE PAIN (7)

## 2024-11-15 NOTE — PROGRESS NOTES
"  Assessment & Plan       Medication management  - Med Therapy Management Referral    Type 2 diabetes mellitus with hyperglycemia, without long-term current use of insulin (H)  doing lifestyle changes, improving diet, in good spirits  Fasting bg per cgm 200-300   Went through some basic use with patient on her cgm, she has some difficulty navigating this, Follow up with diabetic educator  May have more compliance with weekly injections, start ozempic  - Med Therapy Management Referral  - semaglutide (OZEMPIC) 2 MG/3ML pen; Inject 0.25 mg subcutaneously every 7 days for 28 days, THEN 0.5 mg every 7 days for 14 days.  - metFORMIN (GLUCOPHAGE XR) 500 MG 24 hr tablet; Take 2 tablets (1,000 mg) by mouth 2 times daily (with meals).  - Continuous Glucose Sensor (FREESTYLE LILLY 3 PLUS SENSOR) MISC; Use 1 sensor every 15 days. Use to read blood sugars per 's instructions.  - Comprehensive metabolic panel (BMP + Alb, Alk Phos, ALT, AST, Total. Bili, TP); Future  - Hemoglobin A1c; Future    History of drug abuse (H)  History of alcohol abuse   Methamphetamine and alcohol per chart review  Discussed that patient should completely abstain from alcohol, went over CT AP which shows concern for liver cirrhosis    Other chronic pain   Previously on gabapentin, ok to restart  Discussed this may cause drowsiness and weight gain  - gabapentin (NEURONTIN) 300 MG capsule; Take 1 capsule (300 mg) by mouth 3 times daily.    The longitudinal plan of care for the diagnosis(es)/condition(s) as documented were addressed during this visit. Due to the added complexity in care, I will continue to support Vitaly in the subsequent management and with ongoing continuity of care.     BMI  Estimated body mass index is 26.89 kg/m  as calculated from the following:    Height as of this encounter: 1.575 m (5' 2\").    Weight as of this encounter: 66.7 kg (147 lb).     Blood sugar testing frequency justification:  Uncontrolled diabetes, " Adjustment of medication(s), Risk of hypoglycemia with medication(s), and Patient modifying lifestyle changes (diet, exercise) with blood sugars        Subjective   Vitaly is a 63 year old, presenting for the following health issues:  Diabetes and Hypertension        11/15/2024     1:43 PM   Additional Questions   Roomed by Payal THAKKAR LPN   Accompanied by self       Via the Health Maintenance questionnaire, the patient has reported the following services have been completed -[facility], this information has been sent to the abstraction team.  History of Present Illness       Back Pain:  She presents for follow up of back pain. Patient's back pain is a recurring problem.  Location of back pain:  Right lower back, left side of neck, left shoulder and right buttock  Description of back pain: sharp, shooting and stabbing  Back pain spreads: right buttocks, right thigh and left side of neck    Since patient first noticed back pain, pain is: rapidly worsening  Does back pain interfere with her job:  Not applicable       Diabetes:   She presents for follow up of diabetes.  She is checking home blood glucose three times daily.   She checks blood glucose before and after meals.  Blood glucose is sometimes over 200 and never under 70.  When her blood glucose is low, the patient is asymptomatic for confusion, blurred vision, lethargy and reports not feeling dizzy, shaky, or weak.  She is concerned about blood sugar frequently over 200.   She is having blurry vision and weight loss.  The patient has had a diabetic eye exam in the last 12 months. Eye exam performed on october. Location of last eye exam next to this clinic.        Hypertension: She presents for follow up of hypertension.  She does not check blood pressure  regularly outside of the clinic. Outside blood pressures have been over 140/90. She does not follow a low salt diet.     She eats 2-3 servings of fruits and vegetables daily.She consumes  "2 sweetened beverage(s) daily.She exercises with enough effort to increase her heart rate 20 to 29 minutes per day.  She exercises with enough effort to increase her heart rate 3 or less days per week. She is missing 1 dose(s) of medications per week.  She is not taking prescribed medications regularly due to other.                     Objective    BP (!) 130/90   Pulse 84   Temp 98.7  F (37.1  C) (Tympanic)   Resp 20   Ht 1.575 m (5' 2\")   Wt 66.7 kg (147 lb)   LMP 11/15/2019   SpO2 98%   BMI 26.89 kg/m    Body mass index is 26.89 kg/m .  Physical Exam   GENERAL: alert and no distress  NECK: no adenopathy, no asymmetry, masses, or scars  RESP: lungs clear to auscultation - no rales, rhonchi or wheezes  CV: regular rate and rhythm, normal S1 S2, no S3 or S4, no murmur, click or rub, no peripheral edema  ABDOMEN: soft, nontender, no hepatosplenomegaly, no masses and bowel sounds normal  MS: no gross musculoskeletal defects noted, no edema            Signed Electronically by: Cathy Silva MD    "

## 2024-11-19 ENCOUNTER — TELEPHONE (OUTPATIENT)
Dept: FAMILY MEDICINE | Facility: CLINIC | Age: 64
End: 2024-11-19
Payer: COMMERCIAL

## 2024-11-19 NOTE — TELEPHONE ENCOUNTER
MTM referral from: [facility] visit (referral by provider)    MTM referral outreach attempt #2 on November 19, 2024 at 11:10 AM      Outcome: Patient not reachable after several attempts, sent [employer] message    Use [facility] med adv  for the carrier/Plan on the flowsheet      MyChart Message Sent    ÁNGELA Garcia   [phone]

## 2024-12-18 ENCOUNTER — TELEPHONE (OUTPATIENT)
Dept: FAMILY MEDICINE | Facility: CLINIC | Age: 64
End: 2024-12-18
Payer: COMMERCIAL

## 2024-12-18 DIAGNOSIS — I25.10 CORONARY ARTERY DISEASE INVOLVING NATIVE HEART WITHOUT ANGINA PECTORIS, UNSPECIFIED VESSEL OR LESION TYPE: Primary | ICD-10-CM

## 2024-12-18 RX ORDER — ROSUVASTATIN CALCIUM 20 MG/1
20 TABLET, COATED ORAL DAILY
Qty: 90 TABLET | Refills: 3 | Status: SHIPPED | OUTPATIENT
Start: 2024-12-18 | End: 2025-03-18

## 2024-12-18 NOTE — TELEPHONE ENCOUNTER
"Routing request to provider for review - Potential opportunity for statin in patient with diabetes     Our prescription records show that Vitaly Roach, who is a [employer]vant[employer] plan member, has diabetes, and may not be on a statin medication.     Also noted in visit note 9/17/24:  \"Coronary artery disease involving native heart without angina pectoris, unspecified vessel or lesion type  Not on statin - patient with multiple concerns  Plan on starting lipitor or crestor\"      The 10-year ASCVD risk score (Justo PONCE, et al., 2019) is: 21%    Values used to calculate the score:      Age: 63 years      Sex: Female      Is Non- : No      Diabetic: Yes      Tobacco smoker: Yes      Systolic Blood Pressure: 130 mmHg      Is BP treated: Yes      HDL Cholesterol: 43 mg/dL      Total Cholesterol: 191 mg/dL     Consider prescribing patient a statin, if deemed clinically appropriate, and no contraindications.     This request aligns with current guidelines published by the American College of Cardiology/American Heart Association (ACC/AHA) and the American Diabetes Association, which recommend statin therapy for patients, ages 40-75, with diabetes to help prevent atherosclerotic cardiovascular disease (ASCVD).        If not using a statin due to adverse events (specifically rhabdomyolysis or myopathy) or contraindications (specifically cirrhosis), please ensure assessed and coded annually.     Thank you!    Sheri Turcios, PharmD, [facility]  Population Health Pharmacist  [phone]    "

## 2024-12-21 ENCOUNTER — HEALTH MAINTENANCE LETTER (OUTPATIENT)
Age: 64
End: 2024-12-21

## 2025-01-14 ENCOUNTER — TELEPHONE (OUTPATIENT)
Dept: FAMILY MEDICINE | Facility: CLINIC | Age: 65
End: 2025-01-14
Payer: COMMERCIAL

## 2025-01-14 NOTE — TELEPHONE ENCOUNTER
Patient Quality Outreach    Patient is due for the following:   Diabetes -  A1C  Hypertension -  BP check    Action(s) Taken:   Schedule a nurse only visit for BP lab only visit for A1c    Type of outreach:    Sent [employer] message.    Questions for provider review:    None           Reba Jaquez CMA

## 2025-02-11 DIAGNOSIS — E11.65 TYPE 2 DIABETES MELLITUS WITH HYPERGLYCEMIA, WITHOUT LONG-TERM CURRENT USE OF INSULIN (H): ICD-10-CM

## 2025-02-11 RX ORDER — SEMAGLUTIDE 0.68 MG/ML
INJECTION, SOLUTION SUBCUTANEOUS
Qty: 3 ML | Refills: 0 | OUTPATIENT
Start: 2025-02-11

## 2025-02-16 DIAGNOSIS — E11.65 TYPE 2 DIABETES MELLITUS WITH HYPERGLYCEMIA, WITHOUT LONG-TERM CURRENT USE OF INSULIN (H): ICD-10-CM

## 2025-02-17 RX ORDER — METFORMIN HYDROCHLORIDE 500 MG/1
1000 TABLET, EXTENDED RELEASE ORAL 2 TIMES DAILY WITH MEALS
Qty: 360 TABLET | Refills: 0 | Status: SHIPPED | OUTPATIENT
Start: 2025-02-17 | End: 2025-05-18

## 2025-02-24 ENCOUNTER — TELEPHONE (OUTPATIENT)
Dept: FAMILY MEDICINE | Facility: CLINIC | Age: 65
End: 2025-02-24
Payer: COMMERCIAL

## 2025-02-24 NOTE — TELEPHONE ENCOUNTER
General Call    Contacts       Contact Date/Time Type Contact Phone/Fax    2025 11:11 AM CST Phone (Incoming) Delisa (Other) [phone]          Reason for Call:  [facility] insurance needs verification of diagnosis    What are your questions or concerns:  pt is currently enrolled as of  in the [facility] complete care plan and needs to know if patient has been diagnosed with cardiovascular disease, chronic heart failure or diabetes.   If confirmation isn't received from PCP in one of those categories, pt will need to find a new plan    Reference #: [id number]    This is required by medicare and insurance has to comply with medicare requirements.     Verbal confirmation is ok from anyone on the healthcare team.     Date of last appointment with provider: 11/15/2024    Could we send this information to you in [employer] or would you prefer to receive a phone call?:   Patient would prefer a phone call   Okay to leave a detailed message?: N/A at Other phone number:  [phone]  Can also fax something, fax #: [phone]  Needs to include:   Members name  and ref # and per [facility] request we are verifying that pt has been diagnosed with ... (Be specific, just to make sure pt has seen PCP in last 6 months and is still a current diagnosis) signed and dated by PCP

## 2025-03-18 DIAGNOSIS — E11.65 TYPE 2 DIABETES MELLITUS WITH HYPERGLYCEMIA, WITHOUT LONG-TERM CURRENT USE OF INSULIN (H): ICD-10-CM

## 2025-03-18 NOTE — TELEPHONE ENCOUNTER
Patient reports that she has covid, and unable to do labs.     Diagnosed: yesterday with covid, (5 people in her building that have covid) declines treatment.     She is unable to leave building however she can have someone  her medication.     Lab appt schedule: 4/01/25    Michelle BEAUCHAMP  Station

## 2025-03-19 RX ORDER — SEMAGLUTIDE 1.34 MG/ML
1 INJECTION, SOLUTION SUBCUTANEOUS
Qty: 3 ML | Refills: 0 | Status: SHIPPED | OUTPATIENT
Start: 2025-03-19

## 2025-03-22 ENCOUNTER — HEALTH MAINTENANCE LETTER (OUTPATIENT)
Age: 65
End: 2025-03-22

## 2025-05-11 DIAGNOSIS — E11.65 TYPE 2 DIABETES MELLITUS WITH HYPERGLYCEMIA, WITHOUT LONG-TERM CURRENT USE OF INSULIN (H): ICD-10-CM

## 2025-05-12 RX ORDER — SEMAGLUTIDE 1.34 MG/ML
1 INJECTION, SOLUTION SUBCUTANEOUS
Qty: 3 ML | Refills: 3 | Status: SHIPPED | OUTPATIENT
Start: 2025-05-12

## 2025-05-12 NOTE — TELEPHONE ENCOUNTER
Pending Prescriptions:                       Disp   Refills    OZEMPIC (1 MG/DOSE) 4 MG/3ML pen [Pharmac*3 mL   0            Sig: INJECT 1 MG UNDER THE SKIN EVERY 7 DAYS.    Last office visit: 11/15/2024 ;  Next 5 appointments (look out 90 days)      May 21, 2025 3:30 PM  (Arrive by 3:10 PM)  Provider Visit with Cathy Silva MD  [facility] ([facility] ) [address] [place] [id number] [phone]             Patient called to check on status of refill.

## 2025-06-10 ENCOUNTER — TELEPHONE (OUTPATIENT)
Dept: FAMILY MEDICINE | Facility: CLINIC | Age: 65
End: 2025-06-10
Payer: COMMERCIAL

## 2025-06-10 NOTE — TELEPHONE ENCOUNTER
Patient Quality Outreach    Patient is due for the following:   Diabetes -  A1C and Diabetic Follow-Up Visit  Hypertension -  Hypertension follow-up visit  Colon Cancer Screening  Cervical Cancer Screening - PAP Needed  Depression  -  PHQ-9 needed  IVD  -  IVD Follow-up Visit    Action(s) Taken:   Schedule a office visit for follow up diabetes, hypertension, depression, IVD, and PAP    Type of outreach:    Sent [facility] message.    Questions for provider review:    None         Bailee Kahler  Chart routed to None.

## 2025-07-05 ENCOUNTER — HEALTH MAINTENANCE LETTER (OUTPATIENT)
Age: 65
End: 2025-07-05

## 2025-08-05 ENCOUNTER — TELEPHONE (OUTPATIENT)
Dept: FAMILY MEDICINE | Facility: CLINIC | Age: 65
End: 2025-08-05
Payer: COMMERCIAL